# Patient Record
Sex: MALE | Race: ASIAN | Employment: FULL TIME | URBAN - METROPOLITAN AREA
[De-identification: names, ages, dates, MRNs, and addresses within clinical notes are randomized per-mention and may not be internally consistent; named-entity substitution may affect disease eponyms.]

---

## 2017-01-24 ENCOUNTER — TRANSFERRED RECORDS (OUTPATIENT)
Dept: HEALTH INFORMATION MANAGEMENT | Facility: CLINIC | Age: 47
End: 2017-01-24

## 2017-11-29 ENCOUNTER — TRANSFERRED RECORDS (OUTPATIENT)
Dept: HEALTH INFORMATION MANAGEMENT | Facility: CLINIC | Age: 47
End: 2017-11-29

## 2021-07-29 ENCOUNTER — TRANSFERRED RECORDS (OUTPATIENT)
Dept: HEALTH INFORMATION MANAGEMENT | Facility: CLINIC | Age: 51
End: 2021-07-29

## 2021-08-03 ENCOUNTER — TRANSCRIBE ORDERS (OUTPATIENT)
Dept: OTHER | Age: 51
End: 2021-08-03

## 2021-08-03 DIAGNOSIS — M46.1 BILATERAL SACROILIITIS (H): Primary | ICD-10-CM

## 2021-08-13 ENCOUNTER — TRANSCRIBE ORDERS (OUTPATIENT)
Dept: OTHER | Age: 51
End: 2021-08-13

## 2021-08-13 DIAGNOSIS — M46.1 BILATERAL SACROILIITIS (H): Primary | ICD-10-CM

## 2021-08-16 NOTE — TELEPHONE ENCOUNTER
RECORDS RECEIVED FROM: AMPARO sacroillitis/Dr. Leavitt/MRI/ortho con  [facility]   DATE RECEIVED: Oct 14, 2021     NOTES STATUS DETAILS   OFFICE NOTE from referring provider In process    OFFICE NOTE from other specialist N/A    DISCHARGE SUMMARY from hospital N/A    DISCHARGE REPORT from the ER N/A    OPERATIVE REPORT N/A    MEDICATION LIST Internal    IMPLANT RECORD/STICKER N/A    LABS     CBC/DIFF N/A    CULTURES N/A    INJECTIONS DONE IN RADIOLOGY N/A    MRI In process    CT SCAN In process    XRAYS (IMAGES & REPORTS) In process    TUMOR     PATHOLOGY  Slides & report N/A    08/26/21   9:31 AM   IMAGES IN PACS  COMPLETE  Genesis Regan CMA    08/18/21   3:51 PM   [facility] RECORDS SENT TO SCANNING  Genesis Regan CMA    08/16/21   1:48 PM   FAXED REQUEST TO [facility] [phone]  Genesis Regan CMA

## 2021-10-14 ENCOUNTER — PRE VISIT (OUTPATIENT)
Dept: ORTHOPEDICS | Facility: CLINIC | Age: 51
End: 2021-10-14

## 2021-10-14 ENCOUNTER — ANCILLARY PROCEDURE (OUTPATIENT)
Dept: GENERAL RADIOLOGY | Facility: CLINIC | Age: 51
End: 2021-10-14
Attending: ORTHOPAEDIC SURGERY
Payer: COMMERCIAL

## 2021-10-14 ENCOUNTER — PREP FOR PROCEDURE (OUTPATIENT)
Dept: ORTHOPEDICS | Facility: CLINIC | Age: 51
End: 2021-10-14

## 2021-10-14 ENCOUNTER — OFFICE VISIT (OUTPATIENT)
Dept: ORTHOPEDICS | Facility: CLINIC | Age: 51
End: 2021-10-14
Payer: COMMERCIAL

## 2021-10-14 VITALS — BODY MASS INDEX: 24.99 KG/M2 | HEIGHT: 65 IN | WEIGHT: 150 LBS

## 2021-10-14 DIAGNOSIS — M46.1 SACROILIITIS (H): Primary | ICD-10-CM

## 2021-10-14 DIAGNOSIS — M46.1 BILATERAL SACROILIITIS (H): Primary | ICD-10-CM

## 2021-10-14 DIAGNOSIS — M46.1 BILATERAL SACROILIITIS (H): ICD-10-CM

## 2021-10-14 PROCEDURE — 99204 OFFICE O/P NEW MOD 45 MIN: CPT | Mod: GC | Performed by: ORTHOPAEDIC SURGERY

## 2021-10-14 PROCEDURE — 72190 X-RAY EXAM OF PELVIS: CPT | Performed by: RADIOLOGY

## 2021-10-14 RX ORDER — CYCLOBENZAPRINE HCL 10 MG
TABLET ORAL PRN
COMMUNITY
Start: 2021-05-10

## 2021-10-14 RX ORDER — TEMAZEPAM 7.5 MG/1
CAPSULE ORAL
COMMUNITY
Start: 2021-10-05

## 2021-10-14 RX ORDER — SERTRALINE HYDROCHLORIDE 100 MG/1
50 TABLET, FILM COATED ORAL EVERY EVENING
COMMUNITY
Start: 2021-03-31

## 2021-10-14 ASSESSMENT — MIFFLIN-ST. JEOR: SCORE: 1459.77

## 2021-10-14 NOTE — NURSING NOTE
"Reason For Visit:   Chief Complaint   Patient presents with     Consult     AMPARO sacroillitis/Dr. Leavitt       Primary MD: Dr. Francisco Javier [facility]  Ref. MD: Dr. Leavitt [facility]    ?  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 Yes.  Work status?  Full time.    Date of injury: 30 yrs ago  Type of injury:  related.    Date of surgery: no  Type of surgery: no.    Smoker: Yes  Request smoking cessation information: No    Ht 1.655 m (5' 5.16\")   Wt 68 kg (150 lb)   BMI 24.84 kg/m      Pain Assessment  Patient Currently in Pain: Yes  0-10 Pain Scale: 7  Primary Pain Location: Back    Oswestry (MALINI) Questionnaire    OSWESTRY DISABILITY INDEX 10/14/2021   Count 9   Sum 26   Oswestry Score (%) 57.78          Visual Analog Pain Scale  Back Pain Scale 0-10: 7  Right leg pain: 9  Left leg pain: 5  Neck Pain Scale 0-10: 0  Right arm pain: 0  Left arm pain: 0    Promis 10 Assessment    PROMIS 10 10/14/2021   In general, would you say your health is: Very good   In general, would you say your quality of life is: Very good   In general, how would you rate your physical health? Very good   In general, how would you rate your mental health, including your mood and your ability to think? Very good   In general, how would you rate your satisfaction with your social activities and relationships? Very good   In general, please rate how well you carry out your usual social activities and roles Very good   To what extent are you able to carry out your everyday physical activities such as walking, climbing stairs, carrying groceries, or moving a chair? Completely   How often have you been bothered by emotional problems such as feeling anxious, depressed or irritable? Sometimes   How would you rate your fatigue on average? Mild   How would you rate your pain on average?   0 = No Pain  to  10 = Worst Imaginable Pain 7   In general, would you say your health is: 4   In general, would you say your quality of life is: 4   In " general, how would you rate your physical health? 4   In general, how would you rate your mental health, including your mood and your ability to think? 4   In general, how would you rate your satisfaction with your social activities and relationships? 4   In general, please rate how well you carry out your usual social activities and roles. (This includes activities at home, at work and in your community, and responsibilities as a parent, child, spouse, employee, friend, etc.) 4   To what extent are you able to carry out your everyday physical activities such as walking, climbing stairs, carrying groceries, or moving a chair? 5   In the past 7 days, how often have you been bothered by emotional problems such as feeling anxious, depressed, or irritable? 3   In the past 7 days, how would you rate your fatigue on average? 2   In the past 7 days, how would you rate your pain on average, where 0 means no pain, and 10 means worst imaginable pain? 7   Global Mental Health Score 15   Global Physical Health Score 15   PROMIS TOTAL - SUBSCORES 30   Some recent data might be hidden                Aleida Recinos LPN

## 2021-10-14 NOTE — LETTER
10/14/2021         RE: Jason Solis  [address]  [place] [address]        Dear Colleague,    Thank you for referring your patient, Jason Solis, to the [facility] [place]. Please see a copy of my visit note below.    Spine Surgery Consultation  REFERRING PHYSICIAN: No ref. provider found   PRIMARY CARE PHYSICIAN: No primary care provider on file.           Chief Complaint:   Consult (AMPARO sacroillitis/Dr. 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  The patient reports that he initially started to have left-sided low back pain with pain radiating down his leg in the 1990s when he was serving in [place] in the .  This was intermittent eventually subsided.  He now has had approximately 1 to 2 years of sustained right low back pain with some radiation down his right leg.  He has been evaluated by pain medication physical therapy many times over the last year at the [employer], [facility]    He was eventually seen by Dr. Daryl Leavitt; who felt his examination was most consistent with sacroiliitis and referred him here to be seen by Dr. Henry for consideration of SI joint fusion.  The patient does note that he had a few injections into the SI joint with very good short-term relief but no sustained relief from these injections.    He at this point is not able to participate in the activities he enjoys doing.  He has a tough time even sitting on his right buttock because of the sensitivity and pain.  He does have some pain radiating into his right shin area.  He has never had surgery on his low back or right SI joint.  He is does not smoke.  He is interesting in  "attempting to improve his quality of life as it is complete very limited by this problem right now current.         Past Medical History:   No past medical history on file.         Past Surgical History:   No past surgical history on file.         Social History:     Social History     Tobacco Use     Smoking status: Not on file   Substance Use Topics     Alcohol use: Not on file            Family History:   No family history on file.         Allergies:     Allergies   Allergen Reactions     Duloxetine Visual Disturbance and Anxiety     Other reaction(s): Drowsy, Visual disturbance, Anxiety     Etodolac Rash          Medications:     Current Outpatient Medications   Medication     cyclobenzaprine (FLEXERIL) 10 MG tablet     sertraline (ZOLOFT) 100 MG tablet     temazepam (RESTORIL) 7.5 MG capsule     No current facility-administered medications for this visit.             Review of Systems:     A 10 point ROS was performed and reviewed. Specific responses to these questions are noted at the end of the document.         Physical Exam:   Vitals: Ht 1.655 m (5' 5.16\")   Wt 68 kg (150 lb)   BMI 24.84 kg/m    Constitutional: awake, alert, cooperative, no apparent distress, appears stated age.    Eyes: The sclera are white.  Ears, Nose, Throat: The trachea is midline.  Psychiatric: The patient has a normal affect.  Respiratory: breathing non-labored  Cardiovascular: The extremities are warm and perfused.  Skin: no obvious rashes or lesions.  Musculoskeletal, Neurologic, and Spine:     SI joint exam:       Right   Left     Madiha Finger Test    positive   negative     LIVIER    Positive   Negative     Thigh Thrust:   Positive   Negative     Pelvic Compression Test    Positive   Negative     Palpation    Positive   Negative     Pelvic Gapping    Positive   Negative     Gaenslen s Test    Positive    Negative     Sacral Thrust (SI)   Positive   Negative     MALINI is 57.78         Imaging:   We ordered and independently " reviewed new radiographs at this clinic visit. The results were discussed with the patient.  Findings include:  Inlet outlet and lateral of the sacrum x-rays obtained today were reviewed and show some sacral dysmorphism with the upsloping sacral portion articulating with the ileum at the SI joint.          MRI of the lumbar spine does show some very early degenerative changes but no severe stenosis or severe spondylolisthesis.           Assessment and Plan:   Assessment:  51 year old male with right sacroiliitis     This patient meets the JUAN JOSÉ and ISASS criteria for minimally invasive SI fusion. These include 3/5 physical exam manuevers being positive, a positive response to injection, failure of extensive non-surgical management, imaging that rules out other possible pain generators (MRI of lumbar spine showing the sacrum, and plain films showing preserved joint space). This patient does not have a generalized pain disorder or generalized pain behavior. Diagnostic imaging, including CT scan and XR, exclude other conditions including tumor, infection, inflammatory arthropathy, and other conditions that could cause low back or buttock pain (hip pathology or spine pathology). CT scan and XR pelvis demonstrate degeneration of right sacroiliac joint which supports diagnosis of right/left sacroiliitis.     Sacroiliac joint pain has been present and has caused significant impairment of patient's daily activities for more then 12 months. Patient has tried and failed conservative management. They have had at least 2 image-guided, contrast-enhanced intra-articular sacroiliac joint anesthetic/ therapeutic injections into the right sacroiliac joint which provided 80-90% relief of patient's pain. Patient has tried medical management, activity modification, and active physical therapy programs targeted at lumbar spine, pelvis, and Sacroiliac joint without relief.  Due to all of these reasons, and failed conservative management,  we recommend minimally invasive right sacroiliac joint fusion to alleviate patient's sacroiliitis.     We discussed minimally invasive sacroiliac joint fusion in detail.  It is performed using titanium triangular rods.  About 70-80% of patients who have an SI fusion experience roughly 50% improvement in symptoms.  About 30% of Dr. Henry's patients have gone on to need the contralateral side fused as well.  We discussed the procedure in detail, and we reviewed the relevant anatomy using a model of the pelvis.  Weight bearing on the surgical side will be limited for three weeks after surgery, and the patient will need crutches during this time.  Physical therapy will be initiated after three weeks, and full recovery typically takes 6-12 weeks.      We reviewed the risks and benefits of the surgery in detail. The risks include those associated with anesthesia, including death, pulmonary embolism, DVT, stroke, myocardial infarction, pneumonia, blindness, and urinary tract infection. Additional risks include the risk of blood loss, infection, nerve damage, failure to heal, hardware problems and failure of the intervention to improve symptoms.  With regard to blood loss, we use a medication called tranexamic acid.  Blood loss is typically around 25 mL during an SI fusion, and we have not had to transfuse anyone during or after this particular procedure.  To mitigate the risk of infection, we use IV antibiotics.  Deep infections after this surgery are rare.  To avoid hardware problems and nerve damage, we use an intra-operative CT, which is like GPS for the spine. The patient understands the risks of the surgery and wishes to proceed.     Plan:  1. Right SI joint fusion     Angel Small MD   Orthopaedic Surgery Resident     I saw and evaluated the patient and developed the plan.  Yvan Henry MD              Again, thank you for allowing me to participate in the care of your patient.        Sincerely,        Yvan  Juwan Henry MD

## 2021-10-14 NOTE — LETTER
Date:December 31, 2021      Patient was self referred, no letter generated. Do not send.        [facility] Information

## 2021-10-20 ENCOUNTER — TELEPHONE (OUTPATIENT)
Dept: ORTHOPEDICS | Facility: CLINIC | Age: 51
End: 2021-10-20

## 2021-10-20 NOTE — TELEPHONE ENCOUNTER
Patient is scheduled for surgery with Dr. Henry    Spoke with: Patient    Date of Surgery: 12/6/21    Location: [place]    Post ops: 6 & 12 weeks    Pre op with Provider: Complete    H&P: Scheduled with PAC 11/10/21    Pre-procedure COVID-19 Test: [place] 12/2/21    Additional imaging/appointments: N/A    Surgery packet: Received in clinic     Additional comments: N/A

## 2021-10-20 NOTE — TELEPHONE ENCOUNTER
FUTURE VISIT INFORMATION      SURGERY INFORMATION:    Date: 12/6/21    Location: [facility]    Surgeon:  Yvan Henry MD    Anesthesia Type:  General    Procedure: Minimally invasive Right sacroiliac joint fusion    Consult: OV 10/14    RECORDS REQUESTED FROM:       [facility]

## 2021-10-29 ENCOUNTER — DOCUMENTATION ONLY (OUTPATIENT)
Dept: ORTHOPEDICS | Facility: CLINIC | Age: 51
End: 2021-10-29

## 2021-11-03 DIAGNOSIS — Z11.59 ENCOUNTER FOR SCREENING FOR OTHER VIRAL DISEASES: ICD-10-CM

## 2021-11-10 ENCOUNTER — LAB (OUTPATIENT)
Dept: LAB | Facility: CLINIC | Age: 51
End: 2021-11-10
Payer: COMMERCIAL

## 2021-11-10 ENCOUNTER — OFFICE VISIT (OUTPATIENT)
Dept: SURGERY | Facility: CLINIC | Age: 51
End: 2021-11-10
Payer: COMMERCIAL

## 2021-11-10 ENCOUNTER — ANESTHESIA EVENT (OUTPATIENT)
Dept: SURGERY | Facility: CLINIC | Age: 51
End: 2021-11-10

## 2021-11-10 VITALS
BODY MASS INDEX: 24.19 KG/M2 | HEIGHT: 66 IN | SYSTOLIC BLOOD PRESSURE: 151 MMHG | RESPIRATION RATE: 16 BRPM | HEART RATE: 63 BPM | OXYGEN SATURATION: 99 % | DIASTOLIC BLOOD PRESSURE: 95 MMHG | WEIGHT: 150.5 LBS | TEMPERATURE: 98 F

## 2021-11-10 DIAGNOSIS — Z01.818 PREOP EXAMINATION: ICD-10-CM

## 2021-11-10 DIAGNOSIS — M46.1 SACROILIITIS (H): ICD-10-CM

## 2021-11-10 DIAGNOSIS — Z01.818 PREOP EXAMINATION: Primary | ICD-10-CM

## 2021-11-10 LAB
ABO/RH(D): NORMAL
ANION GAP SERPL CALCULATED.3IONS-SCNC: 5 MMOL/L (ref 3–14)
ANTIBODY SCREEN: NEGATIVE
BASOPHILS # BLD AUTO: 0.1 10E3/UL (ref 0–0.2)
BASOPHILS NFR BLD AUTO: 1 %
BUN SERPL-MCNC: 11 MG/DL (ref 7–30)
CALCIUM SERPL-MCNC: 9.1 MG/DL (ref 8.5–10.1)
CHLORIDE BLD-SCNC: 106 MMOL/L (ref 94–109)
CO2 SERPL-SCNC: 30 MMOL/L (ref 20–32)
CREAT SERPL-MCNC: 1.01 MG/DL (ref 0.66–1.25)
EOSINOPHIL # BLD AUTO: 0.2 10E3/UL (ref 0–0.7)
EOSINOPHIL NFR BLD AUTO: 4 %
ERYTHROCYTE [DISTWIDTH] IN BLOOD BY AUTOMATED COUNT: 11.9 % (ref 10–15)
GFR SERPL CREATININE-BSD FRML MDRD: 85 ML/MIN/1.73M2
GLUCOSE BLD-MCNC: 97 MG/DL (ref 70–99)
HCT VFR BLD AUTO: 44.1 % (ref 40–53)
HGB BLD-MCNC: 14.3 G/DL (ref 13.3–17.7)
IMM GRANULOCYTES # BLD: 0 10E3/UL
IMM GRANULOCYTES NFR BLD: 0 %
LYMPHOCYTES # BLD AUTO: 1.1 10E3/UL (ref 0.8–5.3)
LYMPHOCYTES NFR BLD AUTO: 23 %
MCH RBC QN AUTO: 33.3 PG (ref 26.5–33)
MCHC RBC AUTO-ENTMCNC: 32.4 G/DL (ref 31.5–36.5)
MCV RBC AUTO: 103 FL (ref 78–100)
MONOCYTES # BLD AUTO: 0.3 10E3/UL (ref 0–1.3)
MONOCYTES NFR BLD AUTO: 7 %
NEUTROPHILS # BLD AUTO: 3.1 10E3/UL (ref 1.6–8.3)
NEUTROPHILS NFR BLD AUTO: 65 %
NRBC # BLD AUTO: 0 10E3/UL
NRBC BLD AUTO-RTO: 0 /100
PLATELET # BLD AUTO: 265 10E3/UL (ref 150–450)
POTASSIUM BLD-SCNC: 4.2 MMOL/L (ref 3.4–5.3)
RBC # BLD AUTO: 4.29 10E6/UL (ref 4.4–5.9)
SODIUM SERPL-SCNC: 141 MMOL/L (ref 133–144)
SPECIMEN EXPIRATION DATE: NORMAL
WBC # BLD AUTO: 4.7 10E3/UL (ref 4–11)

## 2021-11-10 PROCEDURE — 86850 RBC ANTIBODY SCREEN: CPT | Mod: 90 | Performed by: PATHOLOGY

## 2021-11-10 PROCEDURE — 85025 COMPLETE CBC W/AUTO DIFF WBC: CPT | Performed by: PATHOLOGY

## 2021-11-10 PROCEDURE — 86901 BLOOD TYPING SEROLOGIC RH(D): CPT | Mod: 90 | Performed by: PATHOLOGY

## 2021-11-10 PROCEDURE — 80048 BASIC METABOLIC PNL TOTAL CA: CPT | Performed by: PATHOLOGY

## 2021-11-10 PROCEDURE — 99000 SPECIMEN HANDLING OFFICE-LAB: CPT | Performed by: PATHOLOGY

## 2021-11-10 PROCEDURE — 99204 OFFICE O/P NEW MOD 45 MIN: CPT | Performed by: PHYSICIAN ASSISTANT

## 2021-11-10 PROCEDURE — 36415 COLL VENOUS BLD VENIPUNCTURE: CPT | Performed by: PATHOLOGY

## 2021-11-10 PROCEDURE — 86900 BLOOD TYPING SEROLOGIC ABO: CPT | Mod: 90 | Performed by: PATHOLOGY

## 2021-11-10 ASSESSMENT — ENCOUNTER SYMPTOMS: SEIZURES: 0

## 2021-11-10 ASSESSMENT — MIFFLIN-ST. JEOR: SCORE: 1475.41

## 2021-11-10 ASSESSMENT — LIFESTYLE VARIABLES: TOBACCO_USE: 1

## 2021-11-10 ASSESSMENT — PAIN SCALES - GENERAL: PAINLEVEL: SEVERE PAIN (7)

## 2021-11-10 NOTE — H&P
Pre-Operative H & P     CC:  Preoperative exam to assess for increased cardiopulmonary risk while undergoing surgery and anesthesia.    Date of Encounter: 11/10/2021  Primary Care Physician:  No primary care provider on file.     Reason for visit:   Encounter Diagnoses   Name Primary?     Sacroiliitis (H) Yes     Preop examination        HPI  Jason Solis is a 52 y/o male who presents for pre-operative H&P in preparation for Minimally invasive Right sacroiliac joint fusion with Yvan Henry MD on 12/6/21 at [facility] for treatment of Sacroiliitis (H). Patient is being evaluated for comorbid conditions of tobacco use, anxiety, tinnitus, hx head injury, Mooers palsy, insomnia, GERD, IBS, ulnar nerve entrapment at elbow, cervicalgia.     Mr. Solis has a longstanding history of right-sided low back pain.  The patient reports that he initially started to have left-sided low back pain with pain radiating down his leg in the 1990s that  was intermittent and eventually subsided.  He now has had approximately 1 to 2 years of sustained right low back pain with some radiation down his right leg. He had a few injections into the SI joint with very good short-term relief but no sustained relief from these injections. He at this point is not able to participate in the activities he enjoys doing.  He has a tough time even sitting on his right buttock because of the sensitivity and pain. He now presents for the above procedure.    History was obtained from patient & chart review.     Hx of abnormal bleeding or anti-platelet use: denies    Menstrual history: No LMP for male patient.:      Prior to Admission Medications  Current Outpatient Medications   Medication Sig Dispense Refill     cyclobenzaprine (FLEXERIL) 10 MG tablet as needed       sertraline (ZOLOFT) 100 MG tablet every evening        temazepam (RESTORIL) 7.5 MG capsule nightly as needed          Family History  Family  History   Problem Relation Age of Onset     Anesthesia Reaction No family hx of      Cardiovascular No family hx of      Deep Vein Thrombosis (DVT) No family hx of        The complete review of systems is negative other than noted in the HPI or here.       Anesthesia Pre-Procedure Evaluation    Patient: Jason Solis   MRN: [id number] : 1970        Preoperative Diagnosis: * No surgery found *    Procedure :   PAC EVALUATION       Past Medical History:   Diagnosis Date     Alcohol abuse      Anxiety      Bell's palsy      Cervicalgia      Gastroesophageal reflux disease without esophagitis      Insomnia      Irritable bowel syndrome      Sacroiliitis (H)      Tinnitus, unspecified laterality      Tobacco dependence syndrome      Ulnar nerve entrapment at elbow       Past Surgical History:   Procedure Laterality Date     COLONOSCOPY       TONSILLECTOMY        Allergies   Allergen Reactions     Duloxetine Visual Disturbance and Anxiety     Other reaction(s): Drowsy, Visual disturbance, Anxiety     Etodolac Rash      Social History     Tobacco Use     Smoking status: Current Some Day Smoker     Years: 10.00     Types: Cigars, Cigarettes     Smokeless tobacco: Never Used     Tobacco comment: occasional cigar (11/10/21); smoked cigarettes ~ 10 yrs   Substance Use Topics     Alcohol use: Not on file      Wt Readings from Last 1 Encounters:   11/10/21 68.3 kg (150 lb 8 oz)            ROS/MED HX  The complete review of systems is negative other than noted in the HPI or here.  Patient denies recent illness, fever and respiratory infection during past month.  Pt denies steroid use during past year.    ENT/Pulmonary: Comment: Smokes occasional cigar and 1-2 cigarettes per week. Has 10 yr hx cigarettes      (+) tobacco use, Current use,  (-) asthma and sleep apnea   Neurologic: Comment: Hx right Bell's palsy    Tinnitus    Insomnia      (+) no peripheral neuropathy  (-) no seizures, no CVA and migraines  "  Cardiovascular:  - neg cardiovascular ROS     METS/Exercise Tolerance: 4 - Raking leaves, gardening Comment: Able to ascend stairs w/o difficulty. Denies SOB & CP.   Hematologic:  - neg hematologic  ROS  (-) history of blood clots and history of blood transfusion   Musculoskeletal: Comment: B/L carpal tunnel    Arthritis in knees, hips, neck (C5-6 herniation)    (+) arthritis,     GI/Hepatic: Comment: Occasional GERD w/ specific foods   (-) liver disease   Renal/Genitourinary:  - neg Renal ROS  (-) renal disease   Endo:  - neg endo ROS  (-) Type II DM   Psychiatric/Substance Use:     (+) psychiatric history anxiety alcohol abuse     Infectious Disease:  - neg infectious disease ROS     Malignancy:  - neg malignancy ROS     Other:  - neg other ROS          Physical Exam    Airway      Comment: Castano    Mallampati: I   TM distance: > 3 FB   Neck ROM: full   Mouth opening: > 3 cm    Respiratory Devices and Support         Dental  no notable dental history.  Missing 2 lower molars B/L      Cardiovascular   cardiovascular exam normal          Pulmonary   pulmonary exam normal               PAC Discussion and Assessment    ASA Classification: 2  Case is suitable for: [facility]    BP (!) 151/95 (BP Location: Right arm, Patient Position: Chair, Cuff Size: Adult Regular)   Pulse 63   Temp 98  F (36.7  C) (Oral)   Resp 16   Ht 1.676 m (5' 6\")   Wt 68.3 kg (150 lb 8 oz)   SpO2 99%   BMI 24.29 kg/m           Physical Exam  Constitutional: Awake, alert, cooperative, no apparent distress, and appears stated age.  Eyes: Pupils equal, round and reactive to light, extra ocular muscles intact, sclera clear, conjunctiva normal.  HENT: Normocephalic, oral pharynx with moist mucus membranes, good dentition, missing 2 lower molars B/L. No goiter appreciated. No removable dental hardware.  Respiratory: Clear to auscultation bilaterally, no crackles or wheezing. No SOB when supine.  Cardiovascular: Regular rate and rhythm, normal " S1 and S2, and no murmur noted.  Carotids +2, no bruits. No edema. Palpable pulses to radial, DP and PT arteries.   GI: Normal bowel sounds, soft, non-distended, non-tender, no masses palpated.    Lymph/Hematologic: No cervical lymphadenopathy and no supraclavicular lymphadenopathy.  Genitourinary:  deferred  Skin: Warm and dry.  No rashes.   Musculoskeletal: full ROM of neck. There is no redness, warmth, or swelling of the joints. Gross motor strength is normal.    Neurologic: Awake, alert, oriented to name, place and time. Cranial nerves II-XII are grossly intact. Gait is normal. Ambulates from chair to exam table, seats self, lies supine and sits back up w/o assistance.  Neuropsychiatric: Calm, cooperative. Normal affect. Pleasant. Answers questions appropriately, follows commands w/o difficulty.    PRIOR LABS/DIAGNOSTIC STUDIES:    All labs and imaging personally reviewed      3 views pelvis radiograph(s) 10/14/2021  IMPRESSION:  1. No substantial degenerative change at either sacroiliac joint.  2. Mild degenerative change of bilateral hips with loss of normal  sphericity of the bilateral femoral heads but relative preservation of  joint spaces.    The patient's records and results personally reviewed by this provider.         LABS/DIAGNOSTIC STUDIES TODAY:  BMP, CBC, T&S    WBC Count 4.0 - 11.0 10e3/uL 4.7      RBC Count 4.40 - 5.90 10e6/uL 4.29 Low      Hemoglobin 13.3 - 17.7 g/dL 14.3     Hematocrit 40.0 - 53.0 % 44.1     MCV 78 - 100 fL 103 High      MCH 26.5 - 33.0 pg 33.3 High      MCHC 31.5 - 36.5 g/dL 32.4     RDW 10.0 - 15.0 % 11.9     Platelet Count 150 - 450 10e3/uL 265     % Neutrophils % 65     % Lymphocytes % 23     % Monocytes % 7     % Eosinophils % 4     % Basophils % 1     % Immature Granulocytes % 0     NRBCs per 100 WBC <1 /100 0     Absolute Neutrophils 1.6 - 8.3 10e3/uL 3.1     Absolute Lymphocytes 0.8 - 5.3 10e3/uL 1.1     Absolute Monocytes 0.0 - 1.3 10e3/uL 0.3     Absolute Eosinophils  0.0 - 0.7 10e3/uL 0.2     Absolute Basophils 0.0 - 0.2 10e3/uL 0.1     Absolute Immature Granulocytes <=0.0 10e3/uL 0.0     Absolute NRBCs 10e3/uL 0.0        Sodium 133 - 144 mmol/L 141      Potassium 3.4 - 5.3 mmol/L 4.2     Chloride 94 - 109 mmol/L 106     Carbon Dioxide (CO2) 20 - 32 mmol/L 30     Anion Gap 3 - 14 mmol/L 5     Urea Nitrogen 7 - 30 mg/dL 11     Creatinine 0.66 - 1.25 mg/dL 1.01     Calcium 8.5 - 10.1 mg/dL 9.1     Glucose 70 - 99 mg/dL 97     GFR Estimate >60 mL/min/1.73m2 85        COVID19 testing scheduled on 12/2/21      ASSESSMENT and PLAN  Jason Solis is a 52 y/o male who presents for pre-operative H&P in preparation for Minimally invasive Right sacroiliac joint fusion with Yvan Henry MD on 12/6/21 at [facility] for treatment of Sacroiliitis (H).    Pt has had prior anesthetic:  MAC  No history of anesthetic complications.       He has the following specific operative considerations:   # RASHIDA 2/8 = low risk  # VTE risk: 0.26%  # Risk of PONV score = 1.  If > 2, anti-emetic intervention recommended.  # Anesthesia considerations:  Refer to PAC assessment in anesthesia records      CARDIAC: METS 4 -  Able to ascend stairs w/o difficulty. Denies SOB & CP. Endorses LBP when walking >2 blocks.     # RCRI : No serious cardiac risks.  0.4% risk of major adverse cardiac event.       PULMONARY:     # Current smoker, smokes occasional cigar, smokes 2-3 cigarettes per day. Endorses 10 yr smoking hx. Lungs CTA on exam.    # Denies asthma or inhaler use    GI:     # Occasional GERD w/ specific foods     ENDO: BMI 25    # No DM    ORTHO:     # full ROM of neck    # Sacroiliitis    # B/L carpal tunnel    # Arthritis in knees, hips, neck (C5-6 herniation per pt)    # Ulnar nerve entrapment of elbow    NEURO/PSYCH:     # Hx right Bell's palsy    # Tinnitus    # Insomnia      Patient is optimized and is acceptable candidate for the proposed procedure. No further  diagnostic evaluation is needed.      Final plan per anesthesiologist on day of surgery.     Arrival time, NPO, shower and medication instructions provided by nursing staff today.  Preparing For Your Surgery handout given.      On the day of service:     Prep time: 15 minutes  Visit time: 20 minutes  Documentation time: 10 minutes  ------------------------------------------  Total time: 45 minutes      Sheba Pascual PA-C  Preoperative Assessment Center  [facility]  Cl[facility] and [facility] Center  Phone: [phone]  Fax: [phone]

## 2021-11-10 NOTE — ANESTHESIA PREPROCEDURE EVALUATION
Anesthesia Pre-Procedure Evaluation    Patient: Jason Solis   MRN: [id number] : 1970        Preoperative Diagnosis: * No surgery found *    Procedure :   PAC EVALUATION       Past Medical History:   Diagnosis Date     Alcohol abuse      Anxiety      Bell's palsy      Cervicalgia      Gastroesophageal reflux disease without esophagitis      Insomnia      Irritable bowel syndrome      Sacroiliitis (H)      Tinnitus, unspecified laterality      Tobacco dependence syndrome      Ulnar nerve entrapment at elbow       Past Surgical History:   Procedure Laterality Date     COLONOSCOPY       TONSILLECTOMY        Allergies   Allergen Reactions     Duloxetine Visual Disturbance and Anxiety     Other reaction(s): Drowsy, Visual disturbance, Anxiety     Etodolac Rash      Social History     Tobacco Use     Smoking status: Current Some Day Smoker     Years: 10.00     Types: Cigars, Cigarettes     Smokeless tobacco: Never Used     Tobacco comment: occasional cigar (11/10/21); smoked cigarettes ~ 10 yrs   Substance Use Topics     Alcohol use: Not on file      Wt Readings from Last 1 Encounters:   11/10/21 68.3 kg (150 lb 8 oz)        Anesthesia Evaluation   Pt has had prior anesthetic. Type: MAC.    No history of anesthetic complications       ROS/MED HX  ENT/Pulmonary: Comment: Smokes occasional cigar and 1-2 cigarettes per week. Has 10 yr hx cigarettes      (+) tobacco use, Current use,  (-) asthma and sleep apnea   Neurologic: Comment: Hx right Bell's palsy    Tinnitus    Insomnia      (+) no peripheral neuropathy  (-) no seizures, no CVA and migraines   Cardiovascular:  - neg cardiovascular ROS     METS/Exercise Tolerance: 4 - Raking leaves, gardening Comment: Able to ascend stairs w/o difficulty. Denies SOB & CP.   Hematologic:  - neg hematologic  ROS  (-) history of blood clots and history of blood transfusion   Musculoskeletal: Comment: B/L carpal tunnel    Arthritis in knees, hips, neck (C5-6 herniation)    (+)  arthritis,     GI/Hepatic: Comment: Occasional GERD w/ specific foods   (-) liver disease   Renal/Genitourinary:  - neg Renal ROS  (-) renal disease   Endo:  - neg endo ROS  (-) Type II DM   Psychiatric/Substance Use:     (+) psychiatric history anxiety alcohol abuse     Infectious Disease:  - neg infectious disease ROS     Malignancy:  - neg malignancy ROS     Other:  - neg other ROS          Physical Exam    Airway      Comment: Castano    Mallampati: I   TM distance: > 3 FB   Neck ROM: full   Mouth opening: > 3 cm    Respiratory Devices and Support         Dental  no notable dental history         Cardiovascular   cardiovascular exam normal          Pulmonary   pulmonary exam normal                OUTSIDE LABS:  CBC: No results found for: WBC, HGB, HCT, PLT  BMP: No results found for: NA, POTASSIUM, CHLORIDE, CO2, BUN, CR, GLC  COAGS: No results found for: PTT, INR, FIBR  POC: No results found for: BGM, HCG, HCGS  HEPATIC: No results found for: ALBUMIN, PROTTOTAL, ALT, AST, GGT, ALKPHOS, BILITOTAL, BILIDIRECT, MINESH  OTHER: No results found for: PH, LACT, A1C, GABRIELA, PHOS, MAG, LIPASE, AMYLASE, TSH, T4, T3, CRP, SED          PAC Discussion and Assessment    ASA Classification: 2  Case is suitable for: [place]  Anesthetic techniques and relevant risks discussed: GA  Invasive monitoring and risk discussed: No    Possibility and Risk of blood transfusion discussed: No            PAC Resident/NP Anesthesia Assessment: Jason Solis is a 52 y/o male who presents for pre-operative H&P in preparation for Minimally invasive Right sacroiliac joint fusion with Yvan Henry MD on 12/6/21 at [facility] for treatment of Sacroiliitis (H).    Pt has had prior anesthetic:  MAC  No history of anesthetic complications.       He has the following specific operative considerations:   # RASHIDA 2/8 = low risk  # VTE risk: 0.26%  # Risk of PONV score = 1.  If > 2, anti-emetic intervention  recommended.  # Anesthesia considerations:  Refer to PAC assessment in anesthesia records      CARDIAC: METS 4 -  Able to ascend stairs w/o difficulty. Denies SOB & CP. Endorses LBP when walking >2 blocks.     # RCRI : No serious cardiac risks.  0.4% risk of major adverse cardiac event.       PULMONARY:     # Current smoker, smokes occasional cigar, smokes 2-3 cigarettes per day. Endorses 10 yr smoking hx. Lungs CTA on exam.    # Denies asthma or inhaler use    GI:     # Occasional GERD w/ specific foods     ENDO: BMI 25    # No DM    ORTHO:     # full ROM of neck    # Sacroiliitis    # B/L carpal tunnel    # Arthritis in knees, hips, neck (C5-6 herniation per pt)    # Ulnar nerve entrapment of elbow    NEURO/PSYCH:     # Hx right Bell's palsy    # Tinnitus    # Insomnia      Patient is optimized and is acceptable candidate for the proposed procedure. No further diagnostic evaluation is needed.      Final plan per anesthesiologist on day of surgery.       Reviewed and Signed by PAC Mid-Level Provider/Resident  Mid-Level Provider/Resident: Sheba Pascual PA-C  Date: 11/10/21  Time: 1031                               Sheba Pascual PA-C

## 2021-11-10 NOTE — PATIENT INSTRUCTIONS
Preparing for Your Surgery      Name:  Jason Solis   MRN:  [id number]   :  1970   Today's Date:  11/10/2021       Arriving for surgery:  Surgery date:  2021  Arrival time:  5:30AM    Restrictions due to COVID 19:       One visitor is allowed in the Pre Op area.       When you go into surgery, one visitor is allowed to wait in the Surgery Waiting Room       (provided there is enough space to social distance).         In hospital patients are allowed 1 visitor per day       The visitor may change daily     Visiting Hours: 8 am - 8:30 pm   No ill visitors.   All visitors must wear face mask.    [email or web address] parking is available for anyone with mobility limitations or disabilities.  ([place]  24 hours/ 7 days a week; [facility]  7 am- 3:30 pm, Mon- Fri)    Please come to:     [facility] [address]  [address] [place]  [address]    -[email or web address] parking is available in front of [facility] from7AM-3:30PM. If you prefer, park your car in the Green Lot.    -Proceed to the 3rd floor, check in at the Adult Surgery Waiting Lounge. [phone]    If an escort is needed stop at the Information Desk in the lobby. Inform the information person that you are here for surgery. An escort to the Adult Surgery Waiting Lounge will be provided.        What can I eat or drink?  -  You may eat and drink normally for up to 8 hours before your surgery. (Until 11:30PM )  -  You may have clear liquids until 2 hours before surgery. (Until 5:30AM)    Examples of clear liquids:  Water  Clear broth  Juices (apple, white grape, white cranberry  and cider) without pulp  Noncarbonated, powder based beverages  (lemonade and Dominic-Aid)  Sodas (Sprite, 7-Up, ginger ale and seltzer)  Coffee or tea (without milk or cream)  Gatorade    -  No Alcohol for at least 24 hours before surgery     Which medicines can I take?    Hold Aspirin for 7 days before surgery.   Hold  Multivitamins for 7 days before surgery.  Hold Supplements for 7 days before surgery.  Hold Ibuprofen (Advil, Motrin) for 1 day before surgery--unless otherwise directed by surgeon.  Hold Naproxen (Aleve) for 4 days before surgery.      -  PLEASE TAKE these medications the day of surgery:  Flexeril if needed    How do I prepare myself?  - Please take 2 showers before surgery using Scrubcare or Hibiclens soap.    Use this soap only from the neck to your toes.     Leave the soap on your skin for one minute--then rinse thoroughly.      You may use your own shampoo and conditioner; no other hair products.   - Please remove all jewelry and body piercings.  - No lotions, deodorants or fragrance.  - No makeup or fingernail polish.   - Bring your ID and insurance card.    -If you have a Deep Brain Stimulator, Spinal Cord Stimulator or any neuro stimulator device---you must bring the remote control to the hospital     - All patients are required to have a Covid-19 test within 4 days of surgery/procedure.      -Patients will be contacted by the [facility] scheduling team within 1 week of surgery to make an appointment.      - Patients may call the Scheduling team at [phone] if they have not been scheduled within 4 days of  surgery.      ALL PATIENTS GOING HOME THE SAME DAY OF SURGERY ARE REQUIRED TO HAVE A RESPONSIBLE ADULT TO DRIVE AND BE IN ATTENDANCE WITH THEM FOR 24 HOURS FOLLOWING SURGERY.      Questions or Concerns:    - For any questions regarding the day of surgery or your hospital stay, please contact the Pre Admission Nursing Office at [phone].       - If you have health changes between today and your surgery please call your surgeon.       For questions after surgery please call your surgeons office.

## 2021-11-11 DIAGNOSIS — M46.1 SACROILIITIS (H): Primary | ICD-10-CM

## 2021-11-29 ENCOUNTER — TELEPHONE (OUTPATIENT)
Dept: ORTHOPEDICS | Facility: CLINIC | Age: 51
End: 2021-11-29

## 2021-11-29 NOTE — TELEPHONE ENCOUNTER
Phoned patient to let him know that his surgery with Dr Henry on 12/6/21 needs to be rescheduled due to an unexpected change in Dr Henry's schedule. Patient was offered a few next available options and my number to call back when he is able. [phone]

## 2021-11-29 NOTE — TELEPHONE ENCOUNTER
Received call back from patient, agreeable to reschedule his surgery from 12/6/21 to 12/20/21 with Dr Henry. Patient will update his H&P virtually with PAC on 12/7 and has rescheduled his COVID test.

## 2021-12-07 ENCOUNTER — PRE VISIT (OUTPATIENT)
Dept: SURGERY | Facility: CLINIC | Age: 51
End: 2021-12-07

## 2021-12-07 ENCOUNTER — ANESTHESIA EVENT (OUTPATIENT)
Dept: SURGERY | Facility: CLINIC | Age: 51
End: 2021-12-07
Payer: COMMERCIAL

## 2021-12-07 ENCOUNTER — VIRTUAL VISIT (OUTPATIENT)
Dept: SURGERY | Facility: CLINIC | Age: 51
End: 2021-12-07
Payer: COMMERCIAL

## 2021-12-07 DIAGNOSIS — Z01.818 PREOP EXAMINATION: Primary | ICD-10-CM

## 2021-12-07 PROCEDURE — 99213 OFFICE O/P EST LOW 20 MIN: CPT | Mod: 95 | Performed by: PHYSICIAN ASSISTANT

## 2021-12-07 RX ORDER — MULTIPLE VITAMINS W/ MINERALS TAB 9MG-400MCG
1 TAB ORAL EVERY MORNING
COMMUNITY

## 2021-12-07 ASSESSMENT — LIFESTYLE VARIABLES: TOBACCO_USE: 1

## 2021-12-07 ASSESSMENT — ENCOUNTER SYMPTOMS: SEIZURES: 0

## 2021-12-07 ASSESSMENT — PAIN SCALES - GENERAL: PAINLEVEL: SEVERE PAIN (6)

## 2021-12-07 NOTE — PROGRESS NOTES
Jason is a 51 year old who is being evaluated via a billable video visit.      How would you like to obtain your AVS? MyChart  If the video visit is dropped, the invitation should be resent by: Text to cell phone: [phone]    HPI         Review of Systems         Objective    Vitals - Patient Reported  Pain Score: Severe Pain (6) (Lower back, Right side leg)        Physical Exam

## 2021-12-07 NOTE — H&P (VIEW-ONLY)
Pre-Operative H & P     CC:  Preoperative exam to assess for increased cardiopulmonary risk while undergoing surgery and anesthesia.    Date of Encounter: 12/7/2021  Primary Care Physician:  [facility], [facility]     Reason for Visit: Sacroiliitis    Video-Visit Details    Type of service:  Video Visit    Patient verbally consented to video service today: YES    Video Start Time: 0743  Video End Time (time video stopped): 0746    Originating Location (pt. Location): Home    Distant Location (provider location):  [facility] PREOPERATIVE [facility]     Mode of Communication:  Video Conference via Affib[employer]      [facility]  Jason Solis is a 52 y/o male who presents for pre-operative H&P in preparation for Minimally invasive Right sacroiliac joint fusion with Yvan Henry MD on 12/20/21 at [facility] for treatment of Sacroiliitis (H). This procedure was initially scheduled on 12/6/21, but was postponed due to an unexpected change in Dr Henry's schedule.    Patient is being evaluated for comorbid conditions of tobacco use, anxiety, tinnitus, hx head injury, Mize palsy, insomnia, GERD, IBS, ulnar nerve entrapment at elbow, cervicalgia.      Mr. Solis has a longstanding history of right-sided low back pain.  The patient reports that he initially started to have left-sided low back pain with pain radiating down his leg in the 1990s that  was intermittent and eventually subsided.  He now has had approximately 1 to 2 years of sustained right low back pain with some radiation down his right leg. He had a few injections into the SI joint with very good short-term relief but no sustained relief from these injections. He at this point is not able to participate in the activities he enjoys doing.  He has a tough time even sitting on his right buttock because of the sensitivity and pain. He now presents for the above procedure.     History was obtained from patient & chart  review.     Hx of abnormal bleeding or anti-platelet use: denies    Menstrual history: No LMP for male patient.:      Prior to Admission Medications  Current Outpatient Medications   Medication Sig Dispense Refill     cyclobenzaprine (FLEXERIL) 10 MG tablet as needed       ibuprofen (ADVIL/MOTRIN) 100 MG tablet Take 100 mg by mouth every 4 hours as needed       multivitamin w/minerals (MULTI-VITAMIN) tablet Take 1 tablet by mouth every morning       sertraline (ZOLOFT) 100 MG tablet every evening        temazepam (RESTORIL) 7.5 MG capsule nightly as needed          Family History  Family History   Problem Relation Age of Onset     Anesthesia Reaction No family hx of      Cardiovascular No family hx of      Deep Vein Thrombosis (DVT) No family hx of        The complete review of systems is negative other than noted in the HPI or here.       Anesthesia Pre-Procedure Evaluation    Patient: Jason Solis   MRN: [id number] : 1970        Preoperative Diagnosis: Sacroiliitis (H) [M46.1]    Procedure : Procedure(s):  Minimally invasive Right sacroiliac joint fusion  Video Visit       Past Medical History:   Diagnosis Date     Alcohol abuse      Anxiety      Bell's palsy      Cervicalgia      Gastroesophageal reflux disease without esophagitis      Insomnia      Irritable bowel syndrome      Sacroiliitis (H)      Tinnitus, unspecified laterality      Tobacco dependence syndrome      Ulnar nerve entrapment at elbow       Past Surgical History:   Procedure Laterality Date     COLONOSCOPY       TONSILLECTOMY        Allergies   Allergen Reactions     Duloxetine Visual Disturbance and Anxiety     Other reaction(s): Drowsy, Visual disturbance, Anxiety     Etodolac Rash      Social History     Tobacco Use     Smoking status: Current Some Day Smoker     Years: 10.00     Types: Cigars, Cigarettes     Smokeless tobacco: Never Used     Tobacco comment: occasional cigar (11/10/21); smoked cigarettes ~ 10 yrs   Substance Use  Topics     Alcohol use: Not on file      Wt Readings from Last 1 Encounters:   11/10/21 68.3 kg (150 lb 8 oz)             ROS/MED HX  The complete review of systems is negative other than noted in the HPI or here.  Patient denies recent illness, fever and respiratory infection during past month.  Pt denies steroid use during past year.    ENT/Pulmonary: Comment: Smokes occasional cigar and 1-2 cigarettes per week. Has 10 yr hx cigarettes      (+) tobacco use, Current use,  (-) asthma and sleep apnea   Neurologic: Comment: Hx right Bell's palsy    Tinnitus    Insomnia      (+) no peripheral neuropathy  (-) no seizures, no CVA and migraines   Cardiovascular:  - neg cardiovascular ROS     METS/Exercise Tolerance: 4 - Raking leaves, gardening Comment: Able to ascend stairs w/o difficulty. Denies SOB & CP.   Hematologic:  - neg hematologic  ROS  (-) history of blood clots and history of blood transfusion   Musculoskeletal: Comment: B/L carpal tunnel    Arthritis in knees, hips, neck (C5-6 herniation)    (+) arthritis,     GI/Hepatic: Comment: Occasional GERD w/ specific foods   (-) liver disease   Renal/Genitourinary:  - neg Renal ROS  (-) renal disease   Endo:  - neg endo ROS  (-) Type II DM   Psychiatric/Substance Use:     (+) psychiatric history anxiety alcohol abuse     Infectious Disease:  - neg infectious disease ROS     Malignancy:  - neg malignancy ROS     Other:  - neg other ROS          Physical Exam    Airway      Comment: Thick beard         Respiratory Devices and Support         Dental           Cardiovascular             Pulmonary             PAC Discussion and Assessment    ASA Classification: 2  Case is suitable for: [facility]        Virtual visit -  No vitals were obtained       Physical Exam  Constitutional: Awake, alert, cooperative, no apparent distress, and appears stated age.  Neurologic: Awake, alert, oriented to name, place and time.   Neuropsychiatric: Calm, cooperative. Normal affect. Answers  questions appropriately.    ** Patient's visit was done virtually today.  A full physical exam was not completed.  Please refer to the physical examination documented by the anesthesiologist in the anesthesia record on the day of surgery. **        PRIOR LABS/DIAGNOSTIC STUDIES:   All labs and imaging personally reviewed     3 views pelvis radiograph(s) 10/14/2021  IMPRESSION:  1. No substantial degenerative change at either sacroiliac joint.  2. Mild degenerative change of bilateral hips with loss of normal  sphericity of the bilateral femoral heads but relative preservation of  joint spaces.    CBC:   Lab Results   Component Value Date    WBC 4.7 11/10/2021    HGB 14.3 11/10/2021    HCT 44.1 11/10/2021     11/10/2021     BMP:   Lab Results   Component Value Date     11/10/2021    POTASSIUM 4.2 11/10/2021    CHLORIDE 106 11/10/2021    CO2 30 11/10/2021    BUN 11 11/10/2021    CR 1.01 11/10/2021    GLC 97 11/10/2021     COAGS: No results found for: PTT, INR, FIBR  POC: No results found for: BGM, HCG, HCGS  HEPATIC: No results found for: ALBUMIN, PROTTOTAL, ALT, AST, GGT, ALKPHOS, BILITOTAL, BILIDIRECT, MINESH  OTHER:   Lab Results   Component Value Date    GABRIELA 9.1 11/10/2021       The patient's records and results personally reviewed by this provider.       COVID19 testing scheduled on 12/16/21    ASSESSMENT and PLAN  Jason Solis is a 50 y/o male who presents for pre-operative H&P in preparation for Minimally invasive Right sacroiliac joint fusion with Yvan Henry MD on 12/20/21 at [facility] for treatment of Sacroiliitis (H). This procedure was initially scheduled on 12/6/21, but was postponed due to an unexpected change in Dr Henry's schedule.    Pt has had prior anesthetic.  No history of anesthetic complications.    He has the following specific operative considerations:   # RASHIDA 2/8 = low risk  # VTE risk: 0.26%  # Risk of PONV score = 1.  If > 2,  anti-emetic intervention recommended.  # Anesthesia considerations:  Refer to PAC assessment in anesthesia records        CARDIAC: METS 4 -  Able to ascend stairs w/o difficulty. Denies SOB & CP. Endorses LBP when walking >2 blocks.     # RCRI : No serious cardiac risks.  0.4% risk of major adverse cardiac event.       PULMONARY:     # Current smoker, smokes occasional cigar, smokes 2-3 cigarettes per day. Endorses 10 yr smoking hx. Lungs CTA on exam.    # Denies asthma or inhaler use     GI:     # Occasional GERD w/ specific foods      ENDO: BMI 25    # No DM     ORTHO:     # full ROM of neck    # Sacroiliitis    # B/L carpal tunnel    # Arthritis in knees, hips, neck (C5-6 herniation per pt)    # Ulnar nerve entrapment of elbow     NEURO/PSYCH:     # Hx right Bell's palsy    # Tinnitus    # Insomnia    Patient is optimized and is acceptable candidate for the proposed procedure. No further diagnostic evaluation is needed.      ** Patient's visit was done virtually today.  A full physical exam was not completed.  Please refer to the physical examination documented by the anesthesiologist in the anesthesia record on the day of surgery. **    Final plan per anesthesiologist on day of surgery.     Arrival time, NPO, shower and medication instructions provided by nursing staff today.  Preparing For Your Surgery handout given.        On the day of service:     Prep time: 8 minutes  Visit time: 4 minutes  Documentation time: 10 minutes  ------------------------------------------  Total time: 22 minutes      Sheba Pascual PA-C  Preoperative Assessment Center  [facility]  Clinic and Surgery Center  Phone: [phone]  Fax: [phone]

## 2021-12-07 NOTE — ANESTHESIA PREPROCEDURE EVALUATION
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3 kg (150 lb 8 oz)        Anesthesia Evaluation   Pt has had prior anesthetic. Type: MAC.    No history of anesthetic complications       ROS/MED HX  ENT/Pulmonary: Comment: Smokes occasional cigar and 1-2 cigarettes per week. Has 10 yr hx cigarettes      (+) tobacco use, Current use,  (-) asthma and sleep apnea   Neurologic: Comment: Hx right Bell's palsy    Tinnitus    Insomnia      (+) no peripheral neuropathy  (-) no seizures, no CVA and migraines   Cardiovascular:  - neg cardiovascular ROS     METS/Exercise Tolerance: 4 - Raking leaves, gardening Comment: Able to ascend stairs w/o difficulty. Denies SOB & CP.   Hematologic:  - neg hematologic  ROS  (-) history of blood clots and history of blood transfusion   Musculoskeletal: Comment: B/L carpal  tunnel    Arthritis in knees, hips, neck (C5-6 herniation)    (+) arthritis,     GI/Hepatic: Comment: Occasional GERD w/ specific foods   (-) liver disease   Renal/Genitourinary:  - neg Renal ROS  (-) renal disease   Endo:  - neg endo ROS  (-) Type II DM   Psychiatric/Substance Use:     (+) psychiatric history anxiety alcohol abuse     Infectious Disease:  - neg infectious disease ROS     Malignancy:  - neg malignancy ROS     Other:  - neg other ROS          Physical Exam    Airway      Comment: Thick beard    Mallampati: I   TM distance: > 3 FB   Neck ROM: full   Mouth opening: > 3 cm    Respiratory Devices and Support  Comment: Breathing effortlessly on room air       Dental     Comment: Dental : no acute issues        Cardiovascular          Rhythm and rate: regular and normal     Pulmonary           breath sounds clear to auscultation       Other findings: Allergies:   -- Duloxetine -- Visual Disturbance and Anxiety    --  Other reaction(s): Drowsy, Visual disturbance,             Anxiety   -- Etodolac -- Rash    Current Facility-Administered Medications:  ceFAZolin (ANCEF) intermittent infusion 2 g in 100 mL dextrose PRE-MIX  ceFAZolin (ANCEF) intermittent infusion 2 g in 100 mL dextrose PRE-MIX  lactated ringers infusion  Medications Prior to Admission:  acetaminophen (TYLENOL) 500 MG tablet, Take 1,000 mg by mouth every 6 hours as needed for mild pain, Disp: , Rfl: , Past Week at Unknown time  cyclobenzaprine (FLEXERIL) 10 MG tablet, as needed, Disp: , Rfl: , 12/19/2021 at 2000  ibuprofen (ADVIL/MOTRIN) 100 MG tablet, Take 800 mg by mouth every 6 hours as needed , Disp: , Rfl: , 12/13/2021 at Unknown time  multivitamin w/minerals (MULTI-VITAMIN) tablet, Take 1 tablet by mouth every morning, Disp: , Rfl: , 12/13/2021 at Unknown time  sertraline (ZOLOFT) 100 MG tablet, 50 mg every evening , Disp: , Rfl: , 12/19/2021 at 2000  temazepam (RESTORIL) 7.5 MG capsule, nightly as needed , Disp: , Rfl: , 12/19/2021 at  2000        OUTSIDE LABS:  CBC:   Lab Results   Component Value Date    WBC 4.7 11/10/2021    HGB 14.3 11/10/2021    HCT 44.1 11/10/2021     11/10/2021     BMP:   Lab Results   Component Value Date     11/10/2021    POTASSIUM 4.2 11/10/2021    CHLORIDE 106 11/10/2021    CO2 30 11/10/2021    BUN 11 11/10/2021    CR 1.01 11/10/2021    GLC 97 11/10/2021     COAGS: No results found for: PTT, INR, FIBR  POC: No results found for: BGM, HCG, HCGS  HEPATIC: No results found for: ALBUMIN, PROTTOTAL, ALT, AST, GGT, ALKPHOS, BILITOTAL, BILIDIRECT, MINESH  OTHER:   Lab Results   Component Value Date    GABRIELA 9.1 11/10/2021       Anesthesia Plan    ASA Status:  2   NPO Status:  NPO Appropriate    Anesthesia Type: General.     - Airway: ETT   Induction: Intravenous, Propofol.   Maintenance: Balanced.        Consents    Anesthesia Plan(s) and associated risks, benefits, and realistic alternatives discussed. Questions answered and patient/representative(s) expressed understanding.     - Discussed: Risks, Benefits and Alternatives for BOTH SEDATION and the PROCEDURE were discussed     - Discussed with:  Patient      - Extended Intubation/Ventilatory Support Discussed: No.      - Patient is DNR/DNI Status: No    Use of blood products discussed: No .     Postoperative Care    Pain management: IV analgesics.   PONV prophylaxis: Ondansetron (or other 5HT-3), Dexamethasone or Solumedrol     Comments:    Other Comments: Jason requests anesthesia. Procedures and risks explained. He understood and consented. Qs answered.           PAC Discussion and Assessment    ASA Classification: 2  Case is suitable for: [facility] [place]                    PAC Resident/NP Anesthesia Assessment: Jason Solis is a 50 y/o male who presents for pre-operative H&P in preparation for Minimally invasive Right sacroiliac joint fusion with Yvan Henry MD on 12/20/21 at [facility] for treatment of Sacroiliitis  (H). This procedure was initially scheduled on 12/6/21, but was postponed due to an unexpected change in Dr Henry's schedule.    Pt has had prior anesthetic.  No history of anesthetic complications.    He has the following specific operative considerations:   # RASHIDA 2/8 = low risk  # VTE risk: 0.26%  # Risk of PONV score = 1.  If > 2, anti-emetic intervention recommended.  # Anesthesia considerations:  Refer to PAC assessment in anesthesia records        CARDIAC: METS 4 -  Able to ascend stairs w/o difficulty. Denies SOB & CP. Endorses LBP when walking >2 blocks.     # RCRI : No serious cardiac risks.  0.4% risk of major adverse cardiac event.       PULMONARY:     # Current smoker, smokes occasional cigar, smokes 2-3 cigarettes per day. Endorses 10 yr smoking hx. Lungs CTA on exam.    # Denies asthma or inhaler use     GI:     # Occasional GERD w/ specific foods      ENDO: BMI 25    # No DM     ORTHO:     # full ROM of neck    # Sacroiliitis    # B/L carpal tunnel    # Arthritis in knees, hips, neck (C5-6 herniation per pt)    # Ulnar nerve entrapment of elbow     NEURO/PSYCH:     # Hx right Bell's palsy    # Tinnitus    # Insomnia    Patient is optimized and is acceptable candidate for the proposed procedure. No further diagnostic evaluation is needed.      ** Patient's visit was done virtually today.  A full physical exam was not completed.  Please refer to the physical examination documented by the anesthesiologist in the anesthesia record on the day of surgery. **    Final plan per anesthesiologist on day of surgery.     Reviewed and Signed by PAC Mid-Level Provider/Resident  Mid-Level Provider/Resident: Sheba Pascual PA-C  Date: 12/7/21  Time: 0800                               Sheba Pascual PA-C

## 2021-12-07 NOTE — H&P
Pre-Operative H & P     CC:  Preoperative exam to assess for increased cardiopulmonary risk while undergoing surgery and anesthesia.    Date of Encounter: 12/7/2021  Primary Care Physician:  [facility], [facility]     Reason for Visit: Sacroiliitis    Video-Visit Details    Type of service:  Video Visit    Patient verbally consented to video service today: YES    Video Start Time: 0743  Video End Time (time video stopped): 0746    Originating Location (pt. Location): Home    Distant Location (provider location):  [facility] PREOPERATIVE ASS[facility]     Mode of Communication:  Video Conference via [employer]      [facility]  Jason Solis is a 52 y/o male who presents for pre-operative H&P in preparation for Minimally invasive Right sacroiliac joint fusion with Yvan Henry MD on 12/20/21 at [facility] for treatment of Sacroiliitis (H). This procedure was initially scheduled on 12/6/21, but was postponed due to an unexpected change in Dr Henry's schedule.    Patient is being evaluated for comorbid conditions of tobacco use, anxiety, tinnitus, hx head injury, [place] palsy, insomnia, GERD, IBS, ulnar nerve entrapment at elbow, cervicalgia.      Mr. Solis has a longstanding history of right-sided low back pain.  The patient reports that he initially started to have left-sided low back pain with pain radiating down his leg in the 1990s that  was intermittent and eventually subsided.  He now has had approximately 1 to 2 years of sustained right low back pain with some radiation down his right leg. He had a few injections into the SI joint with very good short-term relief but no sustained relief from these injections. He at this point is not able to participate in the activities he enjoys doing.  He has a tough time even sitting on his right buttock because of the sensitivity and pain. He now presents for the above procedure.     History was obtained from patient & chart  review.     Hx of abnormal bleeding or anti-platelet use: denies    Menstrual history: No LMP for male patient.:      Prior to Admission Medications  Current Outpatient Medications   Medication Sig Dispense Refill     cyclobenzaprine (FLEXERIL) 10 MG tablet as needed       ibuprofen (ADVIL/MOTRIN) 100 MG tablet Take 100 mg by mouth every 4 hours as needed       multivitamin w/minerals (MULTI-VITAMIN) tablet Take 1 tablet by mouth every morning       sertraline (ZOLOFT) 100 MG tablet every evening        temazepam (RESTORIL) 7.5 MG capsule nightly as needed          Family History  Family History   Problem Relation Age of Onset     Anesthesia Reaction No family hx of      Cardiovascular No family hx of      Deep Vein Thrombosis (DVT) No family hx of        The complete review of systems is negative other than noted in the HPI or here.       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       Past Surgical History:   Procedure Laterality Date     COLONOSCOPY       TONSILLECTOMY        Allergies   Allergen Reactions     Duloxetine Visual Disturbance and Anxiety     Other reaction(s): Drowsy, Visual disturbance, Anxiety     Etodolac Rash      Social History     Tobacco Use     Smoking status: Current Some Day Smoker     Years: 10.00     Types: Cigars, Cigarettes     Smokeless tobacco: Never Used     Tobacco comment: occasional cigar (11/10/21); smoked cigarettes ~ 10 yrs   Substance Use  Topics     Alcohol use: Not on file      Wt Readings from Last 1 Encounters:   11/10/21 68.3 kg (150 lb 8 oz)             ROS/MED HX  The complete review of systems is negative other than noted in the HPI or here.  Patient denies recent illness, fever and respiratory infection during past month.  Pt denies steroid use during past year.    ENT/Pulmonary: Comment: Smokes occasional cigar and 1-2 cigarettes per week. Has 10 yr hx cigarettes      (+) tobacco use, Current use,  (-) asthma and sleep apnea   Neurologic: Comment: Hx right Bell's palsy    Tinnitus    Insomnia      (+) no peripheral neuropathy  (-) no seizures, no CVA and migraines   Cardiovascular:  - neg cardiovascular ROS     METS/Exercise Tolerance: 4 - Raking leaves, gardening Comment: Able to ascend stairs w/o difficulty. Denies SOB & CP.   Hematologic:  - neg hematologic  ROS  (-) history of blood clots and history of blood transfusion   Musculoskeletal: Comment: B/L carpal tunnel    Arthritis in knees, hips, neck (C5-6 herniation)    (+) arthritis,     GI/Hepatic: Comment: Occasional GERD w/ specific foods   (-) liver disease   Renal/Genitourinary:  - neg Renal ROS  (-) renal disease   Endo:  - neg endo ROS  (-) Type II DM   Psychiatric/Substance Use:     (+) psychiatric history anxiety alcohol abuse     Infectious Disease:  - neg infectious disease ROS     Malignancy:  - neg malignancy ROS     Other:  - neg other ROS          Physical Exam    Airway      Comment: Thick beard         Respiratory Devices and Support         Dental           Cardiovascular             Pulmonary             PAC Discussion and Assessment    ASA Classification: 2  Case is suitable for: [facility] [place]        Virtual visit -  No vitals were obtained       Physical Exam  Constitutional: Awake, alert, cooperative, no apparent distress, and appears stated age.  Neurologic: Awake, alert, oriented to name, place and time.   Neuropsychiatric: Calm, cooperative. Normal affect. Answers  questions appropriately.    ** Patient's visit was done virtually today.  A full physical exam was not completed.  Please refer to the physical examination documented by the anesthesiologist in the anesthesia record on the day of surgery. **        PRIOR LABS/DIAGNOSTIC STUDIES:   All labs and imaging personally reviewed     3 views pelvis radiograph(s) 10/14/2021  IMPRESSION:  1. No substantial degenerative change at either sacroiliac joint.  2. Mild degenerative change of bilateral hips with loss of normal  sphericity of the bilateral femoral heads but relative preservation of  joint spaces.    CBC:   Lab Results   Component Value Date    WBC 4.7 11/10/2021    HGB 14.3 11/10/2021    HCT 44.1 11/10/2021     11/10/2021     BMP:   Lab Results   Component Value Date     11/10/2021    POTASSIUM 4.2 11/10/2021    CHLORIDE 106 11/10/2021    CO2 30 11/10/2021    BUN 11 11/10/2021    CR 1.01 11/10/2021    GLC 97 11/10/2021     COAGS: No results found for: PTT, INR, FIBR  POC: No results found for: BGM, HCG, HCGS  HEPATIC: No results found for: ALBUMIN, PROTTOTAL, ALT, AST, GGT, ALKPHOS, BILITOTAL, BILIDIRECT, MINESH  OTHER:   Lab Results   Component Value Date    GABRIELA 9.1 11/10/2021       The patient's records and results personally reviewed by this provider.       COVID19 testing scheduled on 12/16/21    ASSESSMENT and PLAN  Jason Solis is a 52 y/o male who presents for pre-operative H&P in preparation for Minimally invasive Right sacroiliac joint fusion with Yvan Henry MD on 12/20/21 at [facility] for treatment of Sacroiliitis (H). This procedure was initially scheduled on 12/6/21, but was postponed due to an unexpected change in Dr Henry's schedule.    Pt has had prior anesthetic.  No history of anesthetic complications.    He has the following specific operative considerations:   # RASHIDA 2/8 = low risk  # VTE risk: 0.26%  # Risk of PONV score = 1.  If > 2,  anti-emetic intervention recommended.  # Anesthesia considerations:  Refer to PAC assessment in anesthesia records        CARDIAC: METS 4 -  Able to ascend stairs w/o difficulty. Denies SOB & CP. Endorses LBP when walking >2 blocks.     # RCRI : No serious cardiac risks.  0.4% risk of major adverse cardiac event.       PULMONARY:     # Current smoker, smokes occasional cigar, smokes 2-3 cigarettes per day. Endorses 10 yr smoking hx. Lungs CTA on exam.    # Denies asthma or inhaler use     GI:     # Occasional GERD w/ specific foods      ENDO: BMI 25    # No DM     ORTHO:     # full ROM of neck    # Sacroiliitis    # B/L carpal tunnel    # Arthritis in knees, hips, neck (C5-6 herniation per pt)    # Ulnar nerve entrapment of elbow     NEURO/PSYCH:     # Hx right Bell's palsy    # Tinnitus    # Insomnia    Patient is optimized and is acceptable candidate for the proposed procedure. No further diagnostic evaluation is needed.      ** Patient's visit was done virtually today.  A full physical exam was not completed.  Please refer to the physical examination documented by the anesthesiologist in the anesthesia record on the day of surgery. **    Final plan per anesthesiologist on day of surgery.     Arrival time, NPO, shower and medication instructions provided by nursing staff today.  Preparing For Your Surgery handout given.        On the day of service:     Prep time: 8 minutes  Visit time: 4 minutes  Documentation time: 10 minutes  ------------------------------------------  Total time: 22 minutes      Sheba Pascual PA-C  Preoperative Assessment Center  [facility]  Clinic and Surgery Center  Phone: [phone]  Fax: [phone]

## 2021-12-07 NOTE — PATIENT INSTRUCTIONS
Preparing for Your Surgery      Name:  Jason Solis   MRN:  [id number]   :  1970   Today's Date:  2021       Arriving for surgery:  Surgery date:  2021  Arrival time:  5:30 am    Restrictions due to COVID 19:       One visitor is allowed in the Pre Op area.       When you go into surgery, one visitor is allowed to wait in the Surgery Waiting Room       (provided there is enough space to social distance).         In hospital patients are allowed 1 visitor per day       The visitor may change daily     Visiting Hours: 8 am - 8:30 pm   No ill visitors.   All visitors must wear face mask.    [employer] parking is available for anyone with mobility limitations or disabilities.  ([place]  24 hours/ 7 days a week; [facility]  7 am- 3:30 pm, Mon- Fri)    Please come to:     [facility] [address]  [facility]   [address] [place]  [address]    -Parking is available in the Green Ramp     -Proceed to the 3rd floor, check in at the Adult Surgery Waiting Lounge. [phone]    If an escort is needed stop at the Information Desk in the lobby.         What can I eat or drink?  -  You may eat and drink normally for up to 8 hours before your surgery. (Until 21 at 11 pm)  -  You may have clear liquids until 2 hours before surgery. (Until 5:30 am arrival time)    Examples of clear liquids:  Water  Clear broth  Juices (apple, white grape, white cranberry  and cider) without pulp  Noncarbonated, powder based beverages  (lemonade and Dominic-Aid)  Sodas (Sprite, 7-Up, ginger ale and seltzer)  Coffee or tea (without milk or cream)  Gatorade    -  No Alcohol for at least 24 hours before surgery     Which medicines can I take?    Hold Aspirin for 7 days before surgery.     Hold Multivitamins for 7 days before surgery.  Hold Supplements for 7 days before surgery.    Hold Ibuprofen (Advil, Motrin) for 1 day before surgery  Hold  Naproxen (Aleve) for 4 days before surgery.      -  PLEASE TAKE these medications the day of surgery:  Cyclobenzaprine if needed       How do I prepare myself?  - Please take 2 showers before surgery using Scrubcare or Hibiclens soap.    Use this soap only from the neck to your toes.     Leave the soap on your skin for one minute--then rinse thoroughly.      You may use your own shampoo and conditioner; no other hair products.   - Please remove all jewelry and body piercings.  - No lotions, deodorants or fragrance.  - No makeup or fingernail polish.   - Bring your ID and insurance card.    -If you have a Deep Brain Stimulator, Spinal Cord Stimulator or any neuro stimulator device---you must bring the remote control to the hospital     - All patients are required to have a Covid-19 test within 4 days of surgery/procedure.      -Patients will be contacted by the [facility] scheduling team within 1 week of surgery to make an appointment.      - Patients may call the Scheduling team at [phone] if they have not been scheduled within 4 days of  surgery.      ALL PATIENTS GOING HOME THE SAME DAY OF SURGERY ARE REQUIRED TO HAVE A RESPONSIBLE ADULT TO DRIVE AND BE IN ATTENDANCE WITH THEM FOR 24 HOURS FOLLOWING SURGERY.      Questions or Concerns:    - For any questions regarding the day of surgery or your hospital stay, please contact the Pre Admission Nursing Office at [phone].       - If you have health changes between today and your surgery please call your surgeon.       For questions after surgery please call your surgeons office.

## 2021-12-10 ENCOUNTER — PRE VISIT (OUTPATIENT)
Dept: SURGERY | Facility: CLINIC | Age: 51
End: 2021-12-10

## 2021-12-12 ENCOUNTER — HEALTH MAINTENANCE LETTER (OUTPATIENT)
Age: 51
End: 2021-12-12

## 2021-12-16 ENCOUNTER — LAB (OUTPATIENT)
Dept: LAB | Facility: CLINIC | Age: 51
End: 2021-12-16
Payer: COMMERCIAL

## 2021-12-16 DIAGNOSIS — Z11.59 ENCOUNTER FOR SCREENING FOR OTHER VIRAL DISEASES: ICD-10-CM

## 2021-12-16 LAB — SARS-COV-2 RNA RESP QL NAA+PROBE: NEGATIVE

## 2021-12-16 PROCEDURE — U0003 INFECTIOUS AGENT DETECTION BY NUCLEIC ACID (DNA OR RNA); SEVERE ACUTE RESPIRATORY SYNDROME CORONAVIRUS 2 (SARS-COV-2) (CORONAVIRUS DISEASE [COVID-19]), AMPLIFIED PROBE TECHNIQUE, MAKING USE OF HIGH THROUGHPUT TECHNOLOGIES AS DESCRIBED BY CMS-2020-01-R: HCPCS

## 2021-12-16 PROCEDURE — U0005 INFEC AGEN DETEC AMPLI PROBE: HCPCS

## 2021-12-17 ENCOUNTER — TELEPHONE (OUTPATIENT)
Dept: ORTHOPEDICS | Facility: CLINIC | Age: 51
End: 2021-12-17

## 2021-12-17 ENCOUNTER — THERAPY VISIT (OUTPATIENT)
Dept: PHYSICAL THERAPY | Facility: CLINIC | Age: 51
End: 2021-12-17
Attending: ORTHOPAEDIC SURGERY
Payer: COMMERCIAL

## 2021-12-17 DIAGNOSIS — M46.1 SACROILIITIS (H): ICD-10-CM

## 2021-12-17 DIAGNOSIS — G89.29 CHRONIC RIGHT-SIDED LOW BACK PAIN WITHOUT SCIATICA: ICD-10-CM

## 2021-12-17 DIAGNOSIS — M54.50 CHRONIC RIGHT-SIDED LOW BACK PAIN WITHOUT SCIATICA: ICD-10-CM

## 2021-12-17 PROCEDURE — 97161 PT EVAL LOW COMPLEX 20 MIN: CPT | Mod: GP | Performed by: PHYSICAL THERAPIST

## 2021-12-17 PROCEDURE — 97110 THERAPEUTIC EXERCISES: CPT | Mod: GP | Performed by: PHYSICAL THERAPIST

## 2021-12-17 NOTE — LETTER
Return to Work  2021     Seen today: no    Patient:  Jason Solis  :   1970  MRN:     [id number]  Physician: YVAN MAHAJAN    Jason Solis may return to work on Date: 21.      The next clinic appointment is scheduled for 22.    Patient limitations:  Full Time but may decrease hours if not tolerated.    Work from Home.  No Lifting >10#.  No Full weight Bearing Right Leg.       Emailed to patient per his request.      Electronically signed by Yvan Mahajan MD

## 2021-12-17 NOTE — PROGRESS NOTES
Physical Therapy Initial Evaluation  Subjective:  The history is provided by the patient. No  was used.   Patient Health History  Jason Solis being seen for Pre visit for up coming si joint fusion.     Problem began: 11/14/2019.   Problem occurred: On going back injuryp   Pain is reported as 6/10 on pain scale.  General health as reported by patient is good.  Pertinent medical history includes: none.     Medical allergies: none.   Surgeries include:  Other. Other surgery history details: Tonsil surgery.    Current medications:  Anti-depressants, muscle relaxants and sleep medication.    Current occupation is Lpn.   Primary job tasks include:  Computer work and prolonged sitting.                  Therapist Generated HPI Evaluation  Problem details: Pt is schedule for R SIJ fusion on 12/20/21..         Type of problem:  Lumbar.    This is a chronic condition.                                             Objective:  System         Lumbar/SI Evaluation  ROM:    AROM Lumbar:   Flexion:          100%  Ext:                    50%   Side Bend:        Left:  75%    Right:  75%  Rotation:           Left:     Right:   Side Glide:        Left:     Right:                                                                          General     ROS    Assessment/Plan:    Patient is a 51 year old male with lumbar complaints.    Patient has the following significant findings with corresponding treatment plan.                Diagnosis 1:  Pre-op for SIJ fusion  Pain -  self management, education, directional preference exercise and home program  Decreased strength - therapeutic exercise and therapeutic activities  Impaired muscle performance - neuro re-education  Decreased function - therapeutic activities    Therapy Evaluation Codes:      1) Clinical presentation characteristics are:   Stable/Uncomplicated.  2) Decision-Making    Low complexity using standardized patient assessment instrument and/or measureable  assessment of functional outcome.  Cumulative Therapy Evaluation is: Low complexity.    Previous and current functional limitations:  (See Goal Flow Sheet for this information)    Short term and Long term goals: (See Goal Flow Sheet for this information)     Communication ability:  Patient appears to be able to clearly communicate and understand verbal and written communication and follow directions correctly.  Treatment Explanation - The following has been discussed with the patient:   RX ordered/plan of care  Anticipated outcomes  Possible risks and side effects  This patient would benefit from PT intervention to resume normal activities.   Rehab potential is good.    Frequency:  1 X week, once daily  Duration:  for 1 weeks  Discharge Plan:  Achieve all LTG.  Independent in home treatment program.  Reach maximal therapeutic benefit.    Please refer to the daily flowsheet for treatment today, total treatment time and time spent performing 1:1 timed codes.

## 2021-12-17 NOTE — TELEPHONE ENCOUNTER
Return to work letter requested by patient for 12/27. 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 DOS 12/20/21 so pt may go back to work later if not feeling well enough    Doris Gaspar

## 2021-12-20 ENCOUNTER — APPOINTMENT (OUTPATIENT)
Dept: GENERAL RADIOLOGY | Facility: CLINIC | Age: 51
End: 2021-12-20
Attending: ORTHOPAEDIC SURGERY
Payer: COMMERCIAL

## 2021-12-20 ENCOUNTER — HOSPITAL ENCOUNTER (OUTPATIENT)
Facility: CLINIC | Age: 51
Discharge: HOME | End: 2021-12-20
Attending: ORTHOPAEDIC SURGERY | Admitting: ORTHOPAEDIC SURGERY
Payer: COMMERCIAL

## 2021-12-20 ENCOUNTER — ANESTHESIA (OUTPATIENT)
Dept: SURGERY | Facility: CLINIC | Age: 51
End: 2021-12-20
Payer: COMMERCIAL

## 2021-12-20 VITALS
HEART RATE: 71 BPM | BODY MASS INDEX: 24.06 KG/M2 | SYSTOLIC BLOOD PRESSURE: 138 MMHG | HEIGHT: 66 IN | WEIGHT: 149.69 LBS | DIASTOLIC BLOOD PRESSURE: 83 MMHG | OXYGEN SATURATION: 99 % | TEMPERATURE: 98 F | RESPIRATION RATE: 16 BRPM

## 2021-12-20 DIAGNOSIS — M54.50 CHRONIC RIGHT-SIDED LOW BACK PAIN WITHOUT SCIATICA: Primary | ICD-10-CM

## 2021-12-20 DIAGNOSIS — G89.29 CHRONIC RIGHT-SIDED LOW BACK PAIN WITHOUT SCIATICA: Primary | ICD-10-CM

## 2021-12-20 PROBLEM — F41.9 ANXIETY DISORDER: Status: ACTIVE | Noted: 2021-12-20

## 2021-12-20 PROBLEM — F43.12 POST-TRAUMATIC STRESS DISORDER, CHRONIC: Status: ACTIVE | Noted: 2021-12-20

## 2021-12-20 PROBLEM — M25.569 KNEE PAIN: Status: ACTIVE | Noted: 2021-12-20

## 2021-12-20 LAB
ABO/RH(D): NORMAL
ANTIBODY SCREEN: NEGATIVE
CREAT SERPL-MCNC: 1.03 MG/DL (ref 0.66–1.25)
GFR SERPL CREATININE-BSD FRML MDRD: 83 ML/MIN/1.73M2
GLUCOSE BLDC GLUCOMTR-MCNC: 94 MG/DL (ref 70–99)
HGB BLD-MCNC: 12.8 G/DL (ref 13.3–17.7)
PLATELET # BLD AUTO: 257 10E3/UL (ref 150–450)
POTASSIUM BLD-SCNC: 3.6 MMOL/L (ref 3.4–5.3)
SPECIMEN EXPIRATION DATE: NORMAL

## 2021-12-20 PROCEDURE — 250N000011 HC RX IP 250 OP 636: Performed by: PHYSICIAN ASSISTANT

## 2021-12-20 PROCEDURE — 250N000009 HC RX 250: Performed by: NURSE ANESTHETIST, CERTIFIED REGISTERED

## 2021-12-20 PROCEDURE — 250N000013 HC RX MED GY IP 250 OP 250 PS 637: Performed by: PHYSICIAN ASSISTANT

## 2021-12-20 PROCEDURE — 360N000086 HC SURGERY LEVEL 6 W/ FLUORO, PER MIN: Performed by: ORTHOPAEDIC SURGERY

## 2021-12-20 PROCEDURE — 258N000003 HC RX IP 258 OP 636: Performed by: NURSE ANESTHETIST, CERTIFIED REGISTERED

## 2021-12-20 PROCEDURE — 272N000001 HC OR GENERAL SUPPLY STERILE: Performed by: ORTHOPAEDIC SURGERY

## 2021-12-20 PROCEDURE — 250N000011 HC RX IP 250 OP 636: Performed by: NURSE ANESTHETIST, CERTIFIED REGISTERED

## 2021-12-20 PROCEDURE — 710N000012 HC RECOVERY PHASE 2, PER MINUTE: Performed by: ORTHOPAEDIC SURGERY

## 2021-12-20 PROCEDURE — 999N000141 HC STATISTIC PRE-PROCEDURE NURSING ASSESSMENT: Performed by: ORTHOPAEDIC SURGERY

## 2021-12-20 PROCEDURE — 27279 ARTHRD SI JT PLMT TARTCLR DV: CPT | Mod: RT | Performed by: ORTHOPAEDIC SURGERY

## 2021-12-20 PROCEDURE — 85049 AUTOMATED PLATELET COUNT: CPT | Performed by: ANESTHESIOLOGY

## 2021-12-20 PROCEDURE — 710N000010 HC RECOVERY PHASE 1, LEVEL 2, PER MIN: Performed by: ORTHOPAEDIC SURGERY

## 2021-12-20 PROCEDURE — 250N000013 HC RX MED GY IP 250 OP 250 PS 637: Performed by: STUDENT IN AN ORGANIZED HEALTH CARE EDUCATION/TRAINING PROGRAM

## 2021-12-20 PROCEDURE — 250N000009 HC RX 250: Performed by: ORTHOPAEDIC SURGERY

## 2021-12-20 PROCEDURE — 999N000180 XR SURGERY CARM FLUORO LESS THAN 5 MIN: Mod: TC

## 2021-12-20 PROCEDURE — C1713 ANCHOR/SCREW BN/BN,TIS/BN: HCPCS | Performed by: ORTHOPAEDIC SURGERY

## 2021-12-20 PROCEDURE — 370N000017 HC ANESTHESIA TECHNICAL FEE, PER MIN: Performed by: ORTHOPAEDIC SURGERY

## 2021-12-20 PROCEDURE — 250N000011 HC RX IP 250 OP 636: Performed by: ANESTHESIOLOGY

## 2021-12-20 PROCEDURE — 85018 HEMOGLOBIN: CPT | Performed by: ANESTHESIOLOGY

## 2021-12-20 PROCEDURE — 86900 BLOOD TYPING SEROLOGIC ABO: CPT | Performed by: ANESTHESIOLOGY

## 2021-12-20 PROCEDURE — 82565 ASSAY OF CREATININE: CPT | Performed by: ANESTHESIOLOGY

## 2021-12-20 PROCEDURE — 82962 GLUCOSE BLOOD TEST: CPT

## 2021-12-20 PROCEDURE — 84132 ASSAY OF SERUM POTASSIUM: CPT | Performed by: ANESTHESIOLOGY

## 2021-12-20 PROCEDURE — 250N000013 HC RX MED GY IP 250 OP 250 PS 637: Performed by: ANESTHESIOLOGY

## 2021-12-20 PROCEDURE — 250N000025 HC SEVOFLURANE, PER MIN: Performed by: ORTHOPAEDIC SURGERY

## 2021-12-20 PROCEDURE — 36415 COLL VENOUS BLD VENIPUNCTURE: CPT | Performed by: ANESTHESIOLOGY

## 2021-12-20 DEVICE — IMPLANTABLE DEVICE: Type: IMPLANTABLE DEVICE | Site: SPINE LUMBAR | Status: FUNCTIONAL

## 2021-12-20 RX ORDER — ACETAMINOPHEN 500 MG
1000 TABLET ORAL EVERY 6 HOURS PRN
COMMUNITY
End: 2022-02-11

## 2021-12-20 RX ORDER — PROCHLORPERAZINE MALEATE 10 MG
10 TABLET ORAL EVERY 6 HOURS PRN
Status: DISCONTINUED | OUTPATIENT
Start: 2021-12-20 | End: 2021-12-20 | Stop reason: HOSPADM

## 2021-12-20 RX ORDER — OXYCODONE HYDROCHLORIDE 10 MG/1
10 TABLET ORAL EVERY 4 HOURS PRN
Status: DISCONTINUED | OUTPATIENT
Start: 2021-12-20 | End: 2021-12-20 | Stop reason: HOSPADM

## 2021-12-20 RX ORDER — ONDANSETRON 2 MG/ML
INJECTION INTRAMUSCULAR; INTRAVENOUS PRN
Status: DISCONTINUED | OUTPATIENT
Start: 2021-12-20 | End: 2021-12-20

## 2021-12-20 RX ORDER — METHOCARBAMOL 750 MG/1
750 TABLET, FILM COATED ORAL EVERY 6 HOURS PRN
Qty: 60 TABLET | Refills: 0 | Status: SHIPPED | OUTPATIENT
Start: 2021-12-20 | End: 2021-12-20

## 2021-12-20 RX ORDER — DEXMEDETOMIDINE HYDROCHLORIDE 4 UG/ML
INJECTION, SOLUTION INTRAVENOUS PRN
Status: DISCONTINUED | OUTPATIENT
Start: 2021-12-20 | End: 2021-12-20

## 2021-12-20 RX ORDER — ONDANSETRON 2 MG/ML
4 INJECTION INTRAMUSCULAR; INTRAVENOUS EVERY 6 HOURS PRN
Status: DISCONTINUED | OUTPATIENT
Start: 2021-12-20 | End: 2021-12-20 | Stop reason: HOSPADM

## 2021-12-20 RX ORDER — ACETAMINOPHEN 325 MG/1
650 TABLET ORAL EVERY 4 HOURS PRN
Qty: 50 TABLET | Refills: 0 | Status: SHIPPED | OUTPATIENT
Start: 2021-12-20 | End: 2022-03-25

## 2021-12-20 RX ORDER — CEFAZOLIN SODIUM 2 G/100ML
2 INJECTION, SOLUTION INTRAVENOUS SEE ADMIN INSTRUCTIONS
Status: DISCONTINUED | OUTPATIENT
Start: 2021-12-20 | End: 2021-12-20 | Stop reason: HOSPADM

## 2021-12-20 RX ORDER — ACETAMINOPHEN 325 MG/1
650 TABLET ORAL EVERY 4 HOURS PRN
Status: DISCONTINUED | OUTPATIENT
Start: 2021-12-23 | End: 2021-12-20 | Stop reason: HOSPADM

## 2021-12-20 RX ORDER — HYDROXYZINE HYDROCHLORIDE 25 MG/1
25 TABLET, FILM COATED ORAL EVERY 6 HOURS PRN
Qty: 30 TABLET | Refills: 0 | Status: SHIPPED | OUTPATIENT
Start: 2021-12-20 | End: 2022-02-11

## 2021-12-20 RX ORDER — OXYCODONE HYDROCHLORIDE 5 MG/1
5 TABLET ORAL EVERY 4 HOURS PRN
Status: DISCONTINUED | OUTPATIENT
Start: 2021-12-20 | End: 2021-12-20 | Stop reason: HOSPADM

## 2021-12-20 RX ORDER — AMOXICILLIN 250 MG
1 CAPSULE ORAL DAILY
Qty: 30 TABLET | Refills: 0 | Status: SHIPPED | OUTPATIENT
Start: 2021-12-20 | End: 2022-02-11

## 2021-12-20 RX ORDER — FENTANYL CITRATE 50 UG/ML
25 INJECTION, SOLUTION INTRAMUSCULAR; INTRAVENOUS
Status: DISCONTINUED | OUTPATIENT
Start: 2021-12-20 | End: 2021-12-20 | Stop reason: HOSPADM

## 2021-12-20 RX ORDER — DEXAMETHASONE SODIUM PHOSPHATE 4 MG/ML
INJECTION, SOLUTION INTRA-ARTICULAR; INTRALESIONAL; INTRAMUSCULAR; INTRAVENOUS; SOFT TISSUE PRN
Status: DISCONTINUED | OUTPATIENT
Start: 2021-12-20 | End: 2021-12-20

## 2021-12-20 RX ORDER — SODIUM CHLORIDE, SODIUM LACTATE, POTASSIUM CHLORIDE, CALCIUM CHLORIDE 600; 310; 30; 20 MG/100ML; MG/100ML; MG/100ML; MG/100ML
INJECTION, SOLUTION INTRAVENOUS CONTINUOUS
Status: DISCONTINUED | OUTPATIENT
Start: 2021-12-20 | End: 2021-12-20 | Stop reason: HOSPADM

## 2021-12-20 RX ORDER — BUPIVACAINE HYDROCHLORIDE AND EPINEPHRINE 2.5; 5 MG/ML; UG/ML
INJECTION, SOLUTION INFILTRATION; PERINEURAL PRN
Status: DISCONTINUED | OUTPATIENT
Start: 2021-12-20 | End: 2021-12-20 | Stop reason: HOSPADM

## 2021-12-20 RX ORDER — OXYCODONE HYDROCHLORIDE 5 MG/1
5 TABLET ORAL EVERY 6 HOURS PRN
Qty: 15 TABLET | Refills: 0 | Status: SHIPPED | OUTPATIENT
Start: 2021-12-20 | End: 2021-12-20

## 2021-12-20 RX ORDER — METHOCARBAMOL 750 MG/1
750 TABLET, FILM COATED ORAL EVERY 6 HOURS PRN
Status: DISCONTINUED | OUTPATIENT
Start: 2021-12-20 | End: 2021-12-20 | Stop reason: HOSPADM

## 2021-12-20 RX ORDER — ONDANSETRON 2 MG/ML
4 INJECTION INTRAMUSCULAR; INTRAVENOUS EVERY 30 MIN PRN
Status: DISCONTINUED | OUTPATIENT
Start: 2021-12-20 | End: 2021-12-20 | Stop reason: HOSPADM

## 2021-12-20 RX ORDER — FENTANYL CITRATE 50 UG/ML
INJECTION, SOLUTION INTRAMUSCULAR; INTRAVENOUS PRN
Status: DISCONTINUED | OUTPATIENT
Start: 2021-12-20 | End: 2021-12-20

## 2021-12-20 RX ORDER — ACETAMINOPHEN 325 MG/1
975 TABLET ORAL EVERY 8 HOURS
Status: DISCONTINUED | OUTPATIENT
Start: 2021-12-20 | End: 2021-12-20 | Stop reason: HOSPADM

## 2021-12-20 RX ORDER — LIDOCAINE HYDROCHLORIDE 20 MG/ML
INJECTION, SOLUTION INFILTRATION; PERINEURAL PRN
Status: DISCONTINUED | OUTPATIENT
Start: 2021-12-20 | End: 2021-12-20

## 2021-12-20 RX ORDER — ONDANSETRON 4 MG/1
4 TABLET, ORALLY DISINTEGRATING ORAL EVERY 30 MIN PRN
Status: DISCONTINUED | OUTPATIENT
Start: 2021-12-20 | End: 2021-12-20 | Stop reason: HOSPADM

## 2021-12-20 RX ORDER — ONDANSETRON 4 MG/1
4 TABLET, ORALLY DISINTEGRATING ORAL EVERY 6 HOURS PRN
Status: DISCONTINUED | OUTPATIENT
Start: 2021-12-20 | End: 2021-12-20 | Stop reason: HOSPADM

## 2021-12-20 RX ORDER — CEFAZOLIN SODIUM 2 G/100ML
2 INJECTION, SOLUTION INTRAVENOUS
Status: COMPLETED | OUTPATIENT
Start: 2021-12-20 | End: 2021-12-20

## 2021-12-20 RX ORDER — PROPOFOL 10 MG/ML
INJECTION, EMULSION INTRAVENOUS PRN
Status: DISCONTINUED | OUTPATIENT
Start: 2021-12-20 | End: 2021-12-20

## 2021-12-20 RX ORDER — FENTANYL CITRATE 50 UG/ML
25 INJECTION, SOLUTION INTRAMUSCULAR; INTRAVENOUS EVERY 5 MIN PRN
Status: DISCONTINUED | OUTPATIENT
Start: 2021-12-20 | End: 2021-12-20 | Stop reason: HOSPADM

## 2021-12-20 RX ORDER — GABAPENTIN 100 MG/1
300 CAPSULE ORAL
Status: COMPLETED | OUTPATIENT
Start: 2021-12-20 | End: 2021-12-20

## 2021-12-20 RX ORDER — SODIUM CHLORIDE, SODIUM LACTATE, POTASSIUM CHLORIDE, CALCIUM CHLORIDE 600; 310; 30; 20 MG/100ML; MG/100ML; MG/100ML; MG/100ML
INJECTION, SOLUTION INTRAVENOUS CONTINUOUS PRN
Status: DISCONTINUED | OUTPATIENT
Start: 2021-12-20 | End: 2021-12-20

## 2021-12-20 RX ORDER — MAGNESIUM OXIDE 420 MG/1
1 TABLET ORAL
Status: ON HOLD | COMMUNITY
Start: 2021-05-10 | End: 2021-12-20

## 2021-12-20 RX ORDER — METHOCARBAMOL 750 MG/1
750 TABLET, FILM COATED ORAL EVERY 6 HOURS PRN
Qty: 30 TABLET | Refills: 0 | Status: SHIPPED | OUTPATIENT
Start: 2021-12-20 | End: 2022-02-11

## 2021-12-20 RX ORDER — HYDROMORPHONE HYDROCHLORIDE 1 MG/ML
0.2 INJECTION, SOLUTION INTRAMUSCULAR; INTRAVENOUS; SUBCUTANEOUS EVERY 5 MIN PRN
Status: DISCONTINUED | OUTPATIENT
Start: 2021-12-20 | End: 2021-12-20 | Stop reason: HOSPADM

## 2021-12-20 RX ADMIN — DEXMEDETOMIDINE 12 MCG: 100 INJECTION, SOLUTION, CONCENTRATE INTRAVENOUS at 08:48

## 2021-12-20 RX ADMIN — SODIUM CHLORIDE, POTASSIUM CHLORIDE, SODIUM LACTATE AND CALCIUM CHLORIDE: 600; 310; 30; 20 INJECTION, SOLUTION INTRAVENOUS at 07:29

## 2021-12-20 RX ADMIN — HYDROMORPHONE HYDROCHLORIDE 0.2 MG: 1 INJECTION, SOLUTION INTRAMUSCULAR; INTRAVENOUS; SUBCUTANEOUS at 09:27

## 2021-12-20 RX ADMIN — ROCURONIUM BROMIDE 10 MG: 50 INJECTION, SOLUTION INTRAVENOUS at 07:57

## 2021-12-20 RX ADMIN — DEXMEDETOMIDINE 8 MCG: 100 INJECTION, SOLUTION, CONCENTRATE INTRAVENOUS at 07:34

## 2021-12-20 RX ADMIN — FENTANYL CITRATE 50 MCG: 50 INJECTION, SOLUTION INTRAMUSCULAR; INTRAVENOUS at 07:29

## 2021-12-20 RX ADMIN — PROPOFOL 50 MG: 10 INJECTION, EMULSION INTRAVENOUS at 08:26

## 2021-12-20 RX ADMIN — PROPOFOL 150 MG: 10 INJECTION, EMULSION INTRAVENOUS at 07:34

## 2021-12-20 RX ADMIN — DEXAMETHASONE SODIUM PHOSPHATE 8 MG: 4 INJECTION, SOLUTION INTRAMUSCULAR; INTRAVENOUS at 07:44

## 2021-12-20 RX ADMIN — HYDROMORPHONE HYDROCHLORIDE 0.2 MG: 1 INJECTION, SOLUTION INTRAMUSCULAR; INTRAVENOUS; SUBCUTANEOUS at 09:16

## 2021-12-20 RX ADMIN — OXYCODONE HYDROCHLORIDE 5 MG: 5 TABLET ORAL at 09:32

## 2021-12-20 RX ADMIN — HYDROMORPHONE HYDROCHLORIDE 0.2 MG: 1 INJECTION, SOLUTION INTRAMUSCULAR; INTRAVENOUS; SUBCUTANEOUS at 09:54

## 2021-12-20 RX ADMIN — ONDANSETRON 4 MG: 2 INJECTION INTRAMUSCULAR; INTRAVENOUS at 08:27

## 2021-12-20 RX ADMIN — FENTANYL CITRATE 50 MCG: 50 INJECTION, SOLUTION INTRAMUSCULAR; INTRAVENOUS at 07:34

## 2021-12-20 RX ADMIN — LIDOCAINE HYDROCHLORIDE 40 MG: 20 INJECTION, SOLUTION INFILTRATION; PERINEURAL at 08:48

## 2021-12-20 RX ADMIN — BENZOCAINE AND MENTHOL 1 LOZENGE: 15; 3.6 LOZENGE ORAL at 08:58

## 2021-12-20 RX ADMIN — GABAPENTIN 300 MG: 300 CAPSULE ORAL at 06:37

## 2021-12-20 RX ADMIN — FENTANYL CITRATE 25 MCG: 50 INJECTION, SOLUTION INTRAMUSCULAR; INTRAVENOUS at 08:58

## 2021-12-20 RX ADMIN — SUGAMMADEX 150 MG: 100 INJECTION, SOLUTION INTRAVENOUS at 08:30

## 2021-12-20 RX ADMIN — FENTANYL CITRATE 25 MCG: 50 INJECTION, SOLUTION INTRAMUSCULAR; INTRAVENOUS at 09:05

## 2021-12-20 RX ADMIN — HYDROMORPHONE HYDROCHLORIDE 0.2 MG: 1 INJECTION, SOLUTION INTRAMUSCULAR; INTRAVENOUS; SUBCUTANEOUS at 09:40

## 2021-12-20 RX ADMIN — ACETAMINOPHEN 975 MG: 325 TABLET, FILM COATED ORAL at 09:32

## 2021-12-20 RX ADMIN — ROCURONIUM BROMIDE 30 MG: 50 INJECTION, SOLUTION INTRAVENOUS at 07:35

## 2021-12-20 RX ADMIN — CEFAZOLIN 2 G: 10 INJECTION, POWDER, FOR SOLUTION INTRAVENOUS at 07:44

## 2021-12-20 RX ADMIN — MIDAZOLAM 2 MG: 1 INJECTION INTRAMUSCULAR; INTRAVENOUS at 07:29

## 2021-12-20 ASSESSMENT — MIFFLIN-ST. JEOR: SCORE: 1471.75

## 2021-12-20 NOTE — OR NURSING
Pt able to ambulate with crutched adequately in Phase II area.  Ok to discharge home without PT seeing patient.  All questions answered.

## 2021-12-20 NOTE — BRIEF OP NOTE
[facility]    Brief Operative Note    Pre-operative diagnosis: Sacroiliitis (H) [M46.1]  Post-operative diagnosis Same as pre-operative diagnosis    Procedure: Procedure(s):  Minimally invasive Right sacroiliac joint fusion  Surgeon: Surgeon(s) and Role:     * Yvan Henry MD - Primary  Anesthesia: General   Estimated Blood Loss: 15mL    Drains: None  Specimens: * No specimens in log *  Findings:   Enterotomy, serosal tear, dural tear, or laceration secondary to the nature of the procedure, that was recognized and repaired and Please see op note.  Complications: None.  Implants:   Implant Name Type Inv. Item Serial No.  Lot No. LRB No. Used Action   IMP SPINAL FX IFUSE SACROILIAC [id number] 7050M-90 - [id number] [employer]ware[employer] IMP SPINAL FX IFUSE SACROILIAC [id number] 7050M-90  [employer] [id number] Right 1 Implanted   IMP SPINAL IFUSE TORQ [id number] STRL LF [id number] - [id number] Metallic Hardware/[place] IMP SPINAL IFUSE TORQ [id number] STRL LF [id number]  [employer] [id number] Right 1 Implanted     Plan:  Activity: Up with assist until independent. No excessive bending or twisting. No lifting >10 lbs x 6 weeks. No Samara lift for transfers.   Weight bearing status: Partial WB RLE x2 weeks - use crutches for ambulation.  Pain management: Multimodal PO regimen .  Diet: Begin with clear fluids and progress diet as tolerated.   DVT prophylaxis: SCDs only. No chemical DVT ppx needed at discharge.  Dressings: Keep dressing c/d/i x 5-7 days.  Follow-up: As scheduled with ESTEBAN Cardona PA-C on 2/1/2021  Disposition: Home with family    Future Appointments   Date Time Provider Department Center   1/11/2022  2:40 PM Iggy Amaya, PT [facility]   2/1/2022  1:30 PM Renetta Cardona PA-C [facility]   3/15/2022  1:30 PM Renetta Cardona PA-C [facility]     Kael Puente MD  Orthopaedic Surgery, PGY-4

## 2021-12-20 NOTE — DISCHARGE INSTRUCTIONS
"Safety Tips for Using Crutches    Crutch Fit:    Assume good standing posture with shoulders relaxed and crutch tips 6-8 inches out from the side of the foot.    The underarm pad should fall 2-3 fingers width below the armpit.    The handgrip is positioned level with the wrist to allow 30  flexion at the elbow.    Safety Tips:    Bear weight on your hands, not on your armpits.    Do not add extra padding to the underarm pad. This will, in effect, lengthen the crutches and increase risk of nerve injury.    Wear flat, properly fitting shoes. Do not walk in stocking feet, high heels or slippers.    Household hazards:  --Throw rugs should be removed from floors.  --Stairs should be cleared of obstacles.  --Use extra caution on slippery, highly polished, littered or uneven floor surfaces.  --Check for electric cords.    Check crutch tips for excessive wear and keep wing nuts tight.    While walking, look forward with  head up  and  eyes open.  Take equal length steps.    Use BOTH crutches.    Stairs Sequence:    UP: \"Good\" leg first, followed by  bad  leg, then crutches.    DOWN: Crutches, followed by  bad  leg, \"good\" leg.     Walking with Crutches:    Move both crutches forward at the same time.    Non-Weight Bearing (NWB):  Hold the involved leg up and swing through the crutches with the involved leg. The involved leg does not touch the floor.    Toe Touch Weight Bearing (TTWB): Move the involved leg forward. Rest it lightly on the floor for balance only. Step through the crutches with the uninvolved leg.    Partial Weight Bearing (PWB): Move the involved leg forward. Step down the weight of the leg only.  Step through the crutches with the uninvolved leg.    Weight Bearing As Tolerated (WBAT): Move the involved leg forward. Put as much pressure through the involved leg as you can tolerate comfortably. Then step through the crutches with the uninvolved leg.      Same-Day Surgery   Adult Discharge Orders & Instructions "     For 24 hours after surgery:  1. Get plenty of rest.  A responsible adult must stay with you for at least 24 hours after you leave the hospital.   2. Pain medication can slow your reflexes. Do not drive or use heavy equipment.  If you have weakness or tingling, don't drive or use heavy equipment until this feeling goes away.  3. Mixing alcohol and pain medication can cause dizziness and slow your breathing. It can even be fatal. Do not drink alcohol while taking pain medication.  4. Avoid strenuous or risky activities.  Ask for help when climbing stairs.   5. You may feel lightheaded.  If so, sit for a few minutes before standing.  Have someone help you get up.   6. If you have nausea (feel sick to your stomach), drink only clear liquids such as apple juice, ginger ale, broth or 7-Up.  Rest may also help.  Be sure to drink enough fluids.  Move to a regular diet as you feel able. Take pain medications with a small amount of solid food, such as toast or crackers, to avoid nausea.   7. A slight fever is normal. Call the doctor if your fever is over 100 F (37.7 C) (taken under the tongue) or lasts longer than 24 hours.  8. You may have a dry mouth, muscle aches, trouble sleeping or a sore throat.  These symptoms should go away after 24 hours.  9. Do not make important or legal decisions.   Pain Management:      1. Take pain medication (if prescribed) for pain as directed by your physician.        2. WARNING: If the pain medication you have been prescribed contains Tylenol  (acetaminophen), DO NOT take additional doses of Tylenol (acetaminophen).     Call your doctor for any of the followin.  Signs of infection (fever, growing tenderness at the surgery site, severe pain, a large amount of drainage or bleeding, foul-smelling drainage, redness, swelling).    2.  It has been over 8 to 10 hours since surgery and you are still not able to urinate (pee).    3.  Headache for over 24 hours.    4.  Numbness, tingling or  weakness the day after surgery (if you had spinal anesthesia).  To contact a doctor, call Dr. Henry, [facility] [phone]  or:      [phone] and ask for the Resident On Call for:         Orthopedics (answered 24 hours a day)      Emergency Department:  [facility]: [phone]  [facility]: [phone]               Rev. 10/2014       Rev. 4/2014      Same-Day Surgery   Adult Discharge Orders & Instructions     For 24 hours after surgery:  10. Get plenty of rest.  A responsible adult must stay with you for at least 24 hours after you leave the hospital.   11. Pain medication can slow your reflexes. Do not drive or use heavy equipment.  If you have weakness or tingling, don't drive or use heavy equipment until this feeling goes away.  12. Mixing alcohol and pain medication can cause dizziness and slow your breathing. It can even be fatal. Do not drink alcohol while taking pain medication.  13. Avoid strenuous or risky activities.  Ask for help when climbing stairs.   14. You may feel lightheaded.  If so, sit for a few minutes before standing.  Have someone help you get up.   15. If you have nausea (feel sick to your stomach), drink only clear liquids such as apple juice, ginger ale, broth or 7-Up.  Rest may also help.  Be sure to drink enough fluids.  Move to a regular diet as you feel able. Take pain medications with a small amount of solid food, such as toast or crackers, to avoid nausea.   16. A slight fever is normal. Call the doctor if your fever is over 100 F (37.7 C) (taken under the tongue) or lasts longer than 24 hours.  17. You may have a dry mouth, muscle aches, trouble sleeping or a sore throat.  These symptoms should go away after 24 hours.  18. Do not make important or legal decisions.   Pain Management:      1. Take pain medication (if prescribed) for pain as directed by your physician.        2. WARNING: If the pain medication you have been  prescribed contains Tylenol  (acetaminophen), DO NOT take additional doses of Tylenol (acetaminophen).     Call your doctor for any of the followin.  Signs of infection (fever, growing tenderness at the surgery site, severe pain, a large amount of drainage or bleeding, foul-smelling drainage, redness, swelling).    2.  It has been over 8 to 10 hours since surgery and you are still not able to urinate (pee).    3.  Headache for over 24 hours.    4.  Numbness, tingling or weakness the day after surgery (if you had spinal anesthesia).  To contact a doctor, call _____________________________________ or:      [phone] and ask for the Resident On Call for:          __________________________________________ (answered 24 hours a day)      Emergency Department:  [facility]: [phone]  [facility]: [phone]               Rev. 10/2014

## 2021-12-20 NOTE — OP NOTE
Procedure Date: 12/20/2021    PREOPERATIVE DIAGNOSIS:  Right sacroiliitis.    POSTOPERATIVE DIAGNOSIS:  Right sacroiliitis.    PROCEDURE PERFORMED:  Minimally invasive right sacroiliac joint fusion.    SURGEON:  Yvan Henry MD    ASSISTANT:  Kael Puente MD    INDICATIONS FOR PROCEDURE:  The patient is a 51-year-old male with buttock pain for a prolonged period of time.  [facility] felt to have sacroiliac joint pain and was referred to me for evaluation.  The physical exam confirmed.  Diagnostic injections relieved his pain.  He was counseled about risks, benefits, alternative treatments and expected outcomes, and it was his desire to proceed.  Of note is on plain film radiographs, it appeared that he did have a dysmorphic sacrum, and he is a small statured individual, so this affects the overall morphology.    DESCRIPTION OF PROCEDURE:  The patient was taken to the OR team was briefed about the plan.  The patient was brought to the operating room and underwent the induction of adequate general anesthesia, was turned and positioned prone on a 4-poster frame.  He was prepared and draped in the usual sterile fashion.  A timeout was done confirming the site and type of surgery.  He did receive prophylactic antibiotics.    Marcaine 0.25% with epinephrine was infiltrated over his left posterior superior iliac spine and a short percutaneous reference pin was placed into the left posterior superior iliac spine.  An intraoperative 3D image was obtained from the O-arm and transferred to the Stealth image-guided workstation.  This was now used to etta the skin incision over the right buttock, which was then infiltrated with Marcaine 0.25% with epinephrine.  The skin incised and then beginning at the upper portion of S1, a navigated K-wire was delivered and checked under 2D imaging.  The soft tissue was dilated, it was drilled, broached, and a 7.0 x 50 mm iFuse 3D implant was seated into place.   I had originally planned on placing a second implant at S1, but there was not adequate space, the dysmorphic sacrum just made this too small; therefore, I made the decision to place an iFuse TORQ 10.0 x 50 mm screw at the S2 segment, a navigated K-wire was delivered, checked under 2D imaging.  Soft tissue dilated, drilled and then a 10.0 x 50 mm iFuse TORQ screw was seated into place; 2D and 3D imaging was obtained, confirming optimal positioning of the implants.  The wound was now closed in layers.  Estimated blood loss for this case was 15 mL.  We utilized a total of 45 mL of Marcaine 0.25% with epinephrine.    Upon completion of the surgery, I called and left a message for his brother, who was not able to answer his cell phone at that time.    It is anticipated that the patient will be discharged to home on crutches for 3 weeks, then go to CPT at 3 weeks for return to normal gait, then to see me back in clinic in 6 weeks.  He will be given #20 Tylenol No. 3 for pain control.  Has been encouraged to use ice and any problems, issues or concerns, he should give my nurse, Delores, a call.    Yvan Henry Jr, MD        D: 2021   T: 2021   MT: [id number]    Name:     ALEXIS DE LA PAZ  MRN:      [id number]        Account:        [id number]   :      1970           Procedure Date: 2021     Document: [id number]    cc:  Copy For Patient

## 2021-12-20 NOTE — ANESTHESIA CARE TRANSFER NOTE
Patient: Jason Solis    Procedure: Procedure(s):  Minimally invasive Right sacroiliac joint fusion       Diagnosis: Sacroiliitis (H) [M46.1]  Diagnosis Additional Information: No value filed.    Anesthesia Type:   General     Note:    Oropharynx: oropharynx clear of all foreign objects  Level of Consciousness: awake  Oxygen Supplementation: face mask  Level of Supplemental Oxygen (L/min / FiO2): 8  Independent Airway: airway patency satisfactory and stable  Dentition: dentition unchanged  Vital Signs Stable: post-procedure vital signs reviewed and stable  Report to RN Given: handoff report given  Patient transferred to: PACU    Handoff Report: Identifed the Patient, Identified the Reponsible Provider, Reviewed the pertinent medical history, Discussed the surgical course, Reviewed Intra-OP anesthesia mangement and issues during anesthesia, Set expectations for post-procedure period and Allowed opportunity for questions and acknowledgement of understanding      Vitals:  Vitals Value Taken Time   /95 12/20/21 0851   Temp 36.5    Pulse 81 12/20/21 0857   Resp 13 12/20/21 0857   SpO2 99 % 12/20/21 0857   Vitals shown include unvalidated device data.    Electronically Signed By: YISSEL Jane CRNA  December 20, 2021  8:58 AM

## 2021-12-20 NOTE — ANESTHESIA POSTPROCEDURE EVALUATION
Patient: Jason Solis    Procedure: Procedure(s):  Minimally invasive Right sacroiliac joint fusion       Diagnosis:Sacroiliitis (H) [M46.1]  Diagnosis Additional Information: No value filed.    Anesthesia Type:  General    Note:  Disposition: Outpatient   Postop Pain Control: Uneventful            Sign Out: Well controlled pain   PONV: No   Neuro/Psych: Uneventful            Sign Out: Acceptable/Baseline neuro status   Airway/Respiratory: Uneventful            Sign Out: Acceptable/Baseline resp. status   CV/Hemodynamics: Uneventful            Sign Out: Acceptable CV status; No obvious hypovolemia; No obvious fluid overload   Other NRE: NONE   DID A NON-ROUTINE EVENT OCCUR? No    Event details/Postop Comments:  Awakening satisfactorily; strong; breathing well; moving all extremities; comfortable; no complaints or complications. Communicating effectively.            Last vitals:  Vitals Value Taken Time   /102 12/20/21 1003   Temp 36.5  C (97.7  F) 12/20/21 0851   Pulse 102 12/20/21 1003   Resp 19 12/20/21 0952   SpO2 99 % 12/20/21 1008   Vitals shown include unvalidated device data.    Electronically Signed By: Demarcus Cerna MD  December 20, 2021  10:17 AM

## 2021-12-21 NOTE — TELEPHONE ENCOUNTER
See phone message of Fri 12-17-21 requesting work letter.   I waited to call pt until today 12-21-21 for assessment because pt had surgery yesterday Mon. 12-20-21 MIS Right SI Joint Fusion.    Pt states doing very well & pain controlled with meds. Sent home including Tylenol & states  muscle relaxant sent home not helping so he switched back to previous Flexeril which is helping.    Wants to work from home starting Mon. 12-27-21 & understands TTWB Right Leg & No lifting.  Requested work note be emailed to him.  Letter completed & emailed.  Call back prn.  Pt agreed.    S.O./Delores Morales RN.

## 2022-01-11 ENCOUNTER — THERAPY VISIT (OUTPATIENT)
Dept: PHYSICAL THERAPY | Facility: CLINIC | Age: 52
End: 2022-01-11
Payer: COMMERCIAL

## 2022-01-11 DIAGNOSIS — Z47.89 AFTERCARE FOLLOWING SURGERY OF THE MUSCULOSKELETAL SYSTEM: ICD-10-CM

## 2022-01-11 PROBLEM — M54.50 ACUTE BILATERAL LOW BACK PAIN WITHOUT SCIATICA: Status: ACTIVE | Noted: 2021-12-17

## 2022-01-11 PROCEDURE — 97110 THERAPEUTIC EXERCISES: CPT | Mod: GP | Performed by: PHYSICAL THERAPIST

## 2022-01-11 PROCEDURE — 97161 PT EVAL LOW COMPLEX 20 MIN: CPT | Mod: GP | Performed by: PHYSICAL THERAPIST

## 2022-01-11 NOTE — PROGRESS NOTES
Physical Therapy Initial Evaluation  Subjective:  The history is provided by the patient. No  was used.   Patient Health History  Jason Solis being seen for s/p R SIJ fusion.     Problem began: 12/20/2021.   Problem occurred: unknown   Pain is reported as 7/10 on pain scale.  General health as reported by patient is good.  Pertinent medical history includes: history of fractures.   Red flags:  None as reported by patient.  Medical allergies: see EPIC.   Surgeries include:  Orthopedic surgery. Other surgery history details: R SIJ and tonsils.    Current medications:  Anti-depressants, muscle relaxants and sleep medication.    Current occupation is LPN.   Primary job tasks include:  Computer work and prolonged sitting.                  Therapist Generated HPI Evaluation  Problem details: Pt. complains of LBP that has been present since R SI joint fusion on 12/20/21.  No known trauma.     .         Type of problem:  Sacroiliac.    This is a new condition.  Occurance: surgery.    Patient reports pain:  SI joint right and mid lumbar spine.  Pain is described as aching and is intermittent.  Pain radiates to:  Gluteals right and thigh right. Pain is worse during the day.  Since onset symptoms are unchanged.  Associated symptoms:  Loss of strength, loss of motion, numbness, loss of motion/stiffness and tingling. Symptoms are exacerbated by bending, lifting, standing and walking  and relieved by rest.    Previous treatment includes physical therapy.   Restrictions due to condition include:  Working in normal job with restrictions.  Barriers include:  None as reported by patient.                        Objective:  System         Lumbar/SI Evaluation  ROM:    AROM Lumbar:   Flexion:          50% with ER tightness  Ext:                    25% with ER tightness   Side Bend:        Left:  75% with ER tightnss    Right:  75% with ER tightnss  Rotation:           Left:     Right:   Side Glide:        Left:      Right:                     Lumbar Palpation:  Palpation (lumbar): incisions healing well but tender.                                                         General     ROS    Assessment/Plan:    Patient is a 51 year old male with lumbar complaints.    Patient has the following significant findings with corresponding treatment plan.                Diagnosis 1:  S/p R SIJ fusion  Pain -  self management, education, directional preference exercise and home program  Decreased ROM/flexibility - manual therapy and therapeutic exercise  Decreased strength - therapeutic exercise and therapeutic activities  Impaired balance - neuro re-education and therapeutic activities  Decreased proprioception - neuro re-education and therapeutic activities  Impaired gait - gait training  Impaired muscle performance - neuro re-education  Decreased function - therapeutic activities    Therapy Evaluation Codes:   1) Clinical presentation characteristics are:   Stable/Uncomplicated.  2) Decision-Making    Low complexity using standardized patient assessment instrument and/or measureable assessment of functional outcome.  Cumulative Therapy Evaluation is: Low complexity.    Previous and current functional limitations:  (See Goal Flow Sheet for this information)    Short term and Long term goals: (See Goal Flow Sheet for this information)     Communication ability:  Patient appears to be able to clearly communicate and understand verbal and written communication and follow directions correctly.  Treatment Explanation - The following has been discussed with the patient:   RX ordered/plan of care  Anticipated outcomes  Possible risks and side effects  This patient would benefit from PT intervention to resume normal activities.   Rehab potential is good.    Frequency:  2 X week, once daily  Duration:  for 6 weeks  Discharge Plan:  Achieve all LTG.  Independent in home treatment program.  Reach maximal therapeutic benefit.    Please refer to  the daily flowsheet for treatment today, total treatment time and time spent performing 1:1 timed codes.

## 2022-01-21 ENCOUNTER — THERAPY VISIT (OUTPATIENT)
Dept: PHYSICAL THERAPY | Facility: CLINIC | Age: 52
End: 2022-01-21
Payer: COMMERCIAL

## 2022-01-21 DIAGNOSIS — M54.50 ACUTE BILATERAL LOW BACK PAIN WITHOUT SCIATICA: ICD-10-CM

## 2022-01-21 DIAGNOSIS — Z47.89 AFTERCARE FOLLOWING SURGERY OF THE MUSCULOSKELETAL SYSTEM: ICD-10-CM

## 2022-01-21 PROCEDURE — 97110 THERAPEUTIC EXERCISES: CPT | Mod: GP | Performed by: PHYSICAL THERAPIST

## 2022-01-21 PROCEDURE — 97112 NEUROMUSCULAR REEDUCATION: CPT | Mod: GP | Performed by: PHYSICAL THERAPIST

## 2022-01-28 ENCOUNTER — THERAPY VISIT (OUTPATIENT)
Dept: PHYSICAL THERAPY | Facility: CLINIC | Age: 52
End: 2022-01-28
Payer: COMMERCIAL

## 2022-01-28 DIAGNOSIS — Z47.89 AFTERCARE FOLLOWING SURGERY OF THE MUSCULOSKELETAL SYSTEM: ICD-10-CM

## 2022-01-28 DIAGNOSIS — M54.50 ACUTE BILATERAL LOW BACK PAIN WITHOUT SCIATICA: ICD-10-CM

## 2022-01-28 PROCEDURE — 97110 THERAPEUTIC EXERCISES: CPT | Mod: GP | Performed by: PHYSICAL THERAPIST

## 2022-01-28 PROCEDURE — 97112 NEUROMUSCULAR REEDUCATION: CPT | Mod: GP | Performed by: PHYSICAL THERAPIST

## 2022-01-31 DIAGNOSIS — Z98.1 S/P SPINAL FUSION: Primary | ICD-10-CM

## 2022-02-03 ENCOUNTER — THERAPY VISIT (OUTPATIENT)
Dept: PHYSICAL THERAPY | Facility: CLINIC | Age: 52
End: 2022-02-03
Payer: COMMERCIAL

## 2022-02-03 DIAGNOSIS — M54.50 ACUTE BILATERAL LOW BACK PAIN WITHOUT SCIATICA: ICD-10-CM

## 2022-02-03 DIAGNOSIS — Z47.89 AFTERCARE FOLLOWING SURGERY OF THE MUSCULOSKELETAL SYSTEM: ICD-10-CM

## 2022-02-03 PROCEDURE — 97110 THERAPEUTIC EXERCISES: CPT | Mod: GP | Performed by: PHYSICAL THERAPIST

## 2022-02-03 PROCEDURE — 97112 NEUROMUSCULAR REEDUCATION: CPT | Mod: GP | Performed by: PHYSICAL THERAPIST

## 2022-02-04 ASSESSMENT — ENCOUNTER SYMPTOMS
TREMORS: 0
FEVER: 0
MUSCLE CRAMPS: 0
MEMORY LOSS: 0
NUMBNESS: 1
SPEECH CHANGE: 0
WEIGHT LOSS: 1
JOINT SWELLING: 0
WEIGHT GAIN: 0
DISTURBANCES IN COORDINATION: 0
DIZZINESS: 0
ALTERED TEMPERATURE REGULATION: 0
INCREASED ENERGY: 0
BACK PAIN: 1
HEADACHES: 1
MYALGIAS: 1
DECREASED APPETITE: 0
WEAKNESS: 0
SEIZURES: 0
CHILLS: 0
NIGHT SWEATS: 0
FATIGUE: 0
POLYPHAGIA: 0
ARTHRALGIAS: 0
LOSS OF CONSCIOUSNESS: 0
TINGLING: 0
STIFFNESS: 0
POLYDIPSIA: 0
PARALYSIS: 0
MUSCLE WEAKNESS: 0
NECK PAIN: 1
HALLUCINATIONS: 0

## 2022-02-11 ENCOUNTER — ANCILLARY PROCEDURE (OUTPATIENT)
Dept: GENERAL RADIOLOGY | Facility: CLINIC | Age: 52
End: 2022-02-11
Attending: PHYSICIAN ASSISTANT
Payer: COMMERCIAL

## 2022-02-11 ENCOUNTER — OFFICE VISIT (OUTPATIENT)
Dept: ORTHOPEDICS | Facility: CLINIC | Age: 52
End: 2022-02-11
Payer: COMMERCIAL

## 2022-02-11 DIAGNOSIS — M46.1 SACROILIITIS (H): Primary | ICD-10-CM

## 2022-02-11 PROCEDURE — 72190 X-RAY EXAM OF PELVIS: CPT | Performed by: RADIOLOGY

## 2022-02-11 PROCEDURE — 99024 POSTOP FOLLOW-UP VISIT: CPT | Performed by: PHYSICIAN ASSISTANT

## 2022-02-11 NOTE — PROGRESS NOTES
Spine Surgery Post-Op Visit    Chief Complaint:   RECHECK (DOS 12-20-21 MIS Right SacroIlaic Joint Fusion )      Interval HPI:   Jason Solis is a 51 year old male who is 6 weeks s/p R SIJ MIS fusion by Dr. Henry 12/20/2021. He has started PT. He still has a lot of buttock pain, hard to tell if symptoms are improved as he is still quite limited and walking with a cane. He c/o numbness in R heel and temp difference in R lower limb compared to L. Not requiring any pain meds.      Oswestry (MALINI) Questionnaire    OSWESTRY DISABILITY INDEX 2/4/2022   Count 9   Sum 26   Oswestry Score (%) 57.78              Medications:     Current Outpatient Medications   Medication     cyclobenzaprine (FLEXERIL) 10 MG tablet     ibuprofen (ADVIL/MOTRIN) 100 MG tablet     multivitamin w/minerals (MULTI-VITAMIN) tablet     sertraline (ZOLOFT) 100 MG tablet     temazepam (RESTORIL) 7.5 MG capsule     acetaminophen (TYLENOL) 325 MG tablet     No current facility-administered medications for this visit.             Physical Exam:     Vitals: There were no vitals taken for this visit.    Constitutional: Patient is healthy, well-nourished and appears stated age.    Respiratory: Patient is breathing normally and in no respiratory distress.    Skin: Incision well healed, no evidence of wound dehiscence or erythema.     Gait: Non-antalgic gait without use of assistive devices.     Musculoskeletal: unable to do single leg stance on right compared to left. Gait antalgia favoring left.          Imaging:   We independently reviewed and interpreted the following imaging at this clinic visit which were also reviewed with patient  Xrays AP/lat/monsivais pelvis 2/11/2022  2 SI joint fusion devices across the right SI joint. The superior device is triangular implants and the caudal device is a torque screw. No evidence of hardware loosening or failure.     Assessment:   51 year old male 6 weeks s/p R MIS SIJ fusion, doing well     Plan:   He can  discontinue the cane. Continue strengthening and physical therapy for the right lower extremity and gluteus muscles. I suspect the right lower limb numbness is may be related to sciatic nerve irritation and will gradually improve with time. Follow-up in 6 weeks with repeat x-rays.     The patient was shown their own imaging and all questions were answered. Plan was reviewed with Dr. Henry.  Thank you for allowing me to be a part of this patient's care.     REECE GuadalupeC   Orthopedic Spine Surgery

## 2022-02-11 NOTE — LETTER
2/11/2022         RE: Jason Solis  [address]  formerly [facility] [address]        Dear Colleague,    Thank you for referring your patient, Jason Solis, to the [facility] [place]. Please see a copy of my visit note below.     Spine Surgery Post-Op Visit    Chief Complaint:   RECHECK (DOS 12-20-21 MIS Right SacroIlaic Joint Fusion )      Interval HPI:   Jason Solis is a 51 year old male who is 6 weeks s/p R SIJ MIS fusion by Dr. Henry 12/20/2021. He has started PT. He still has a lot of buttock pain, hard to tell if symptoms are improved as he is still quite limited and walking with a cane. He c/o numbness in R heel and temp difference in R lower limb compared to L. Not requiring any pain meds.      Oswestry (MALINI) Questionnaire    OSWESTRY DISABILITY INDEX 2/4/2022   Count 9   Sum 26   Oswestry Score (%) 57.78              Medications:     Current Outpatient Medications   Medication     cyclobenzaprine (FLEXERIL) 10 MG tablet     ibuprofen (ADVIL/MOTRIN) 100 MG tablet     multivitamin w/minerals (MULTI-VITAMIN) tablet     sertraline (ZOLOFT) 100 MG tablet     temazepam (RESTORIL) 7.5 MG capsule     acetaminophen (TYLENOL) 325 MG tablet     No current facility-administered medications for this visit.             Physical Exam:     Vitals: There were no vitals taken for this visit.    Constitutional: Patient is healthy, well-nourished and appears stated age.    Respiratory: Patient is breathing normally and in no respiratory distress.    Skin: Incision well healed, no evidence of wound dehiscence or erythema.     Gait: Non-antalgic gait without use of assistive devices.     Musculoskeletal: unable to do single leg stance on right compared to left. Gait antalgia favoring left.          Imaging:   We independently reviewed and interpreted the following imaging at this clinic visit which were also reviewed with patient  Xrays AP/lat/monsivais pelvis 2/11/2022  2 SI joint fusion  devices across the right SI joint. The superior device is triangular implants and the caudal device is a torque screw. No evidence of hardware loosening or failure.     Assessment:   51 year old male 6 weeks s/p R MIS SIJ fusion, doing well     Plan:   He can discontinue the cane. Continue strengthening and physical therapy for the right lower extremity and gluteus muscles. I suspect the right lower limb numbness is may be related to sciatic nerve irritation and will gradually improve with time. Follow-up in 6 weeks with repeat x-rays.     The patient was shown their own imaging and all questions were answered. Plan was reviewed with Dr. Henry.  Thank you for allowing me to be a part of this patient's care.     Renetta Cardona PA-C   Orthopedic Spine Surgery

## 2022-02-18 ENCOUNTER — THERAPY VISIT (OUTPATIENT)
Dept: PHYSICAL THERAPY | Facility: CLINIC | Age: 52
End: 2022-02-18
Payer: COMMERCIAL

## 2022-02-18 DIAGNOSIS — Z47.89 AFTERCARE FOLLOWING SURGERY OF THE MUSCULOSKELETAL SYSTEM: ICD-10-CM

## 2022-02-18 DIAGNOSIS — M54.50 ACUTE BILATERAL LOW BACK PAIN WITHOUT SCIATICA: ICD-10-CM

## 2022-02-18 PROCEDURE — 97112 NEUROMUSCULAR REEDUCATION: CPT | Mod: GP | Performed by: PHYSICAL THERAPIST

## 2022-02-18 PROCEDURE — 97110 THERAPEUTIC EXERCISES: CPT | Mod: GP | Performed by: PHYSICAL THERAPIST

## 2022-03-11 ENCOUNTER — THERAPY VISIT (OUTPATIENT)
Dept: PHYSICAL THERAPY | Facility: CLINIC | Age: 52
End: 2022-03-11
Payer: COMMERCIAL

## 2022-03-11 DIAGNOSIS — Z47.89 AFTERCARE FOLLOWING SURGERY OF THE MUSCULOSKELETAL SYSTEM: ICD-10-CM

## 2022-03-11 DIAGNOSIS — M54.50 ACUTE BILATERAL LOW BACK PAIN WITHOUT SCIATICA: ICD-10-CM

## 2022-03-11 PROCEDURE — 97112 NEUROMUSCULAR REEDUCATION: CPT | Mod: GP | Performed by: PHYSICAL THERAPIST

## 2022-03-11 PROCEDURE — 97110 THERAPEUTIC EXERCISES: CPT | Mod: GP | Performed by: PHYSICAL THERAPIST

## 2022-03-11 NOTE — PROGRESS NOTES
PROGRESS  REPORT    Progress reporting period is from eval to 3/11/22.       SUBJECTIVE  Subjective changes noted by patient:  .  Subjective: C/c is LBP pain.  Functioning at 30-40% of where he wants to be.  LE stronger but back pain persits.    Current pain level is  Current Pain level: 2/10.     Previous pain level was   Initial Pain level: 7/10.   Changes in function:  Yes (See Goal flowsheet attached for changes in current functional level)  Adverse reaction to treatment or activity: None    OBJECTIVE  Changes noted in objective findings:  Yes,   Objective: R hip strength 4/5,  Core strength 3/5     ASSESSMENT/PLAN  Updated problem list and treatment plan: Diagnosis 1:  SI jt fusion  Pain -  self management, education, directional preference exercise and home program  Decreased ROM/flexibility - manual therapy and therapeutic exercise  Decreased strength - therapeutic exercise and therapeutic activities  Impaired muscle performance - neuro re-education  Decreased function - therapeutic activities  STG/LTGs have been met or progress has been made towards goals:  Yes (See Goal flow sheet completed today.)  Assessment of Progress: The patient's condition is improving.  The patient's condition has potential to improve.  Self Management Plans:  Patient has been instructed in a home treatment program.  Patient is independent in a home treatment program.  I have re-evaluated this patient and find that the nature, scope, duration and intensity of the therapy is appropriate for the medical condition of the patient.      Recommendations:  This patient would benefit from further evaluation.    Please refer to the daily flowsheet for treatment today, total treatment time and time spent performing 1:1 timed codes.

## 2022-03-14 DIAGNOSIS — Z98.1 S/P SPINAL FUSION: Primary | ICD-10-CM

## 2022-03-21 ASSESSMENT — ENCOUNTER SYMPTOMS
WEAKNESS: 0
DISTURBANCES IN COORDINATION: 0
HEADACHES: 1
NUMBNESS: 0
SEIZURES: 0
JOINT SWELLING: 0
MUSCLE CRAMPS: 0
STIFFNESS: 1
DIZZINESS: 0
TREMORS: 0
MYALGIAS: 1
TINGLING: 0
SPEECH CHANGE: 0
MUSCLE WEAKNESS: 1
ARTHRALGIAS: 0
LOSS OF CONSCIOUSNESS: 0
BACK PAIN: 1
MEMORY LOSS: 0
NECK PAIN: 1
PARALYSIS: 0

## 2022-03-25 ENCOUNTER — ANCILLARY PROCEDURE (OUTPATIENT)
Dept: GENERAL RADIOLOGY | Facility: CLINIC | Age: 52
End: 2022-03-25
Attending: PHYSICIAN ASSISTANT
Payer: COMMERCIAL

## 2022-03-25 ENCOUNTER — OFFICE VISIT (OUTPATIENT)
Dept: ORTHOPEDICS | Facility: CLINIC | Age: 52
End: 2022-03-25
Payer: COMMERCIAL

## 2022-03-25 DIAGNOSIS — M46.1 SACROILIITIS (H): Primary | ICD-10-CM

## 2022-03-25 PROCEDURE — 99213 OFFICE O/P EST LOW 20 MIN: CPT | Performed by: PHYSICIAN ASSISTANT

## 2022-03-25 PROCEDURE — 72190 X-RAY EXAM OF PELVIS: CPT | Performed by: RADIOLOGY

## 2022-03-25 NOTE — LETTER
3/25/2022         RE: Jason Solis  [address]  [facility] [place] [facility] [address]        Dear Colleague,    Thank you for referring your patient, Jason Solis, to the [facility] [place]. Please see a copy of my visit note below.    Spine Surgery Post-Op Visit    Chief Complaint:   No chief complaint on file.    Interval HPI:   Jason Solis is a 51 year old male who is 3 months s/p R SIJ MIS fusion by Dr. Henry 12/20/2021.  Unfortunately, he still has a lot of back pain.  This is waking him up at night.  He has noticed that the some of the right lower extremity pain has improved compared to pre-op, but his back pain is about the same.  He is doing physical therapy exercises and gradually advancing. RLE weakness is improving.  He is taking Flexeril but if he takes it too much it is ineffective.  He is taking some Tylenol as well.  Preoperatively, he had many lumbar injections including a work-up for RFA's.  None of them were helpful and he was told to cut down on injections because he had about 6 in 7 month period.    Oswestry (MALINI) Questionnaire    OSWESTRY DISABILITY INDEX 3/21/2022   Count 10   Sum 24   Oswestry Score (%) 48             Medications:     Current Outpatient Medications   Medication     cyclobenzaprine (FLEXERIL) 10 MG tablet     ibuprofen (ADVIL/MOTRIN) 100 MG tablet     multivitamin w/minerals (MULTI-VITAMIN) tablet     sertraline (ZOLOFT) 100 MG tablet     temazepam (RESTORIL) 7.5 MG capsule     No current facility-administered medications for this visit.             Physical Exam:     Vitals: There were no vitals taken for this visit.    Constitutional: Patient is healthy, well-nourished and appears stated age.    Respiratory: Patient is breathing normally and in no respiratory distress.    Skin: Incision well healed, no evidence of wound dehiscence or erythema.     Gait: Non-antalgic gait without use of assistive devices.     Musculoskeletal: Strength: Slight  weakness on the right compared to left with single leg stance.5/5 iliopsoas, 5/5 quadriceps, 5/5 hamstrings, 5/5 anterior tibialis, 5/5 extensor hallucis longus, 5/5 gastrocnemius.     Pain to palpation of R piriformis piriformis stretch positive.  Pain also at approximately L4-5 facets.  Quadratus lumborum nonspasmed, nontender.         Imaging:   We independently reviewed and interpreted the following imaging at this clinic visit which were also reviewed with patient  Xrays AP/lat/monsivais pelvis 3/25/2022  Triangular implant in screw in the right SI joint.  No evidence of hardware loosening or failure.  Disc height loss L4-5, L5-S1.  Right disc space collapse at L4-5.    Assessment    51 year old male 3 months s/p MIS R SIJ fusion   Right piriformis syndrome.     Plan:   Referral to Dr. Burton in PM&R for right piriformis treatment and possible injections.  He may also have some ideas for other interventions for his low back pain.  Reviewed his MRI and x-rays which do show some lumbar spondylosis at L4-5.  He can continue Flexeril and add NSAIDs as well.  Follow-up with Dr. Henry in 3 months with repeat x-rays of the pelvis, or sooner if things are not improving    The patient was shown their own imaging and all questions were answered. Plan was reviewed with Dr. Henry.  Thank you for allowing me to be a part of this patient's care.     Renetta Cardona PA-C   Orthopedic Spine Surgery

## 2022-03-25 NOTE — PROGRESS NOTES
Spine Surgery Post-Op Visit    Chief Complaint:   No chief complaint on file.    Interval HPI:   Jason Solis is a 51 year old male who is 3 months s/p R SIJ MIS fusion by Dr. Henry 12/20/2021.  Unfortunately, he still has a lot of back pain.  This is waking him up at night.  He has noticed that the some of the right lower extremity pain has improved compared to pre-op, but his back pain is about the same.  He is doing physical therapy exercises and gradually advancing. RLE weakness is improving.  He is taking Flexeril but if he takes it too much it is ineffective.  He is taking some Tylenol as well.  Preoperatively, he had many lumbar injections including a work-up for RFA's.  None of them were helpful and he was told to cut down on injections because he had about 6 in 7 month period.    Oswestry (MALINI) Questionnaire    OSWESTRY DISABILITY INDEX 3/21/2022   Count 10   Sum 24   Oswestry Score (%) 48             Medications:     Current Outpatient Medications   Medication     cyclobenzaprine (FLEXERIL) 10 MG tablet     ibuprofen (ADVIL/MOTRIN) 100 MG tablet     multivitamin w/minerals (MULTI-VITAMIN) tablet     sertraline (ZOLOFT) 100 MG tablet     temazepam (RESTORIL) 7.5 MG capsule     No current facility-administered medications for this visit.             Physical Exam:     Vitals: There were no vitals taken for this visit.    Constitutional: Patient is healthy, well-nourished and appears stated age.    Respiratory: Patient is breathing normally and in no respiratory distress.    Skin: Incision well healed, no evidence of wound dehiscence or erythema.     Gait: Non-antalgic gait without use of assistive devices.     Musculoskeletal: Strength: Slight weakness on the right compared to left with single leg stance.5/5 iliopsoas, 5/5 quadriceps, 5/5 hamstrings, 5/5 anterior tibialis, 5/5 extensor hallucis longus, 5/5 gastrocnemius.     Pain to palpation of R piriformis piriformis stretch positive.  Pain also at  approximately L4-5 facets.  Quadratus lumborum nonspasmed, nontender.         Imaging:   We independently reviewed and interpreted the following imaging at this clinic visit which were also reviewed with patient  Xrays AP/lat/monsivais pelvis 3/25/2022  Triangular implant in screw in the right SI joint.  No evidence of hardware loosening or failure.  Disc height loss L4-5, L5-S1.  Right disc space collapse at L4-5.    Assessment    51 year old male 3 months s/p MIS R SIJ fusion   Right piriformis syndrome.     Plan:   Referral to Dr. Burton in PM&R for right piriformis treatment and possible injections.  He may also have some ideas for other interventions for his low back pain.  Reviewed his MRI and x-rays which do show some lumbar spondylosis at L4-5.  He can continue Flexeril and add NSAIDs as well.  Follow-up with Dr. Henry in 3 months with repeat x-rays of the pelvis, or sooner if things are not improving    The patient was shown their own imaging and all questions were answered. Plan was reviewed with Dr. Henry.  Thank you for allowing me to be a part of this patient's care.     Renetta Cardona PA-C   Orthopedic Spine Surgery

## 2022-03-28 ENCOUNTER — TELEPHONE (OUTPATIENT)
Dept: OTHER | Age: 52
End: 2022-03-28

## 2022-03-28 NOTE — TELEPHONE ENCOUNTER
Hello,  I'm with ortho con.  Pt has a referral to see Dr. Burton and his next available is in July.  Is there a way to fit this pt in sooner?  Thank you.

## 2022-04-01 ENCOUNTER — THERAPY VISIT (OUTPATIENT)
Dept: PHYSICAL THERAPY | Facility: CLINIC | Age: 52
End: 2022-04-01
Payer: COMMERCIAL

## 2022-04-01 DIAGNOSIS — Z47.89 AFTERCARE FOLLOWING SURGERY OF THE MUSCULOSKELETAL SYSTEM: ICD-10-CM

## 2022-04-01 DIAGNOSIS — M54.50 ACUTE BILATERAL LOW BACK PAIN WITHOUT SCIATICA: ICD-10-CM

## 2022-04-01 PROCEDURE — 97110 THERAPEUTIC EXERCISES: CPT | Mod: GP | Performed by: PHYSICAL THERAPIST

## 2022-04-01 PROCEDURE — 97112 NEUROMUSCULAR REEDUCATION: CPT | Mod: GP | Performed by: PHYSICAL THERAPIST

## 2022-04-01 NOTE — PROGRESS NOTES
PROGRESS  REPORT    Progress reporting period is from evaluation to 4/1/22.       SUBJECTIVE  Subjective changes noted by patient:  .  Subjective: C/c is LBP still.  Planning on seeing PM and R MD next week to address LBP.      Current pain level is  Current Pain level: 2/10.     Previous pain level was   Initial Pain level: 7/10.   Changes in function:  Yes (See Goal flowsheet attached for changes in current functional level)  Adverse reaction to treatment or activity: None    OBJECTIVE  Changes noted in objective findings:  Yes,   Objective: R hip strength 4/5 Clre strength 3/5 still.     ASSESSMENT/PLAN  Updated problem list and treatment plan: Diagnosis 1:  S/p SIJ fusion  Pain -  self management, education, directional preference exercise and home program  Decreased strength - therapeutic exercise and therapeutic activities  Impaired muscle performance - neuro re-education  Decreased function - therapeutic activities  STG/LTGs have been met or progress has been made towards goals:  Yes (See Goal flow sheet completed today.)  Assessment of Progress: The patient's progress has plateaued.  Self Management Plans:  Patient has been instructed in a home treatment program.  Patient is independent in a home treatment program.  Patient  has been instructed in self management of symptoms.  I have re-evaluated this patient and find that the nature, scope, duration and intensity of the therapy is appropriate for the medical condition of the patient.      Recommendations:  This patient is ready to be discharged from therapy and continue their home treatment program.    Please refer to the daily flowsheet for treatment today, total treatment time and time spent performing 1:1 timed codes.

## 2022-04-05 NOTE — PROGRESS NOTES
[facility] Medical Spine Consultation    Date of visit: 4/6/2022    Assessment:  Jason Solis is a 51 year old male with a past medical history significant for PTSD, anxiety, s/p right SI joint fusion who presents with complaints of:    1. Chronic low back/buttock pain: 3 months s/p right SI joint fusion. Reports having right sided low back pain with referral into the right posterior leg prior to surgery. Since surgery having some improvement in leg symptoms. Continues to have right buttock pain some of which is newer and has some radiation into the posterior thigh and calf. Having increased bilateral low back pain. On exam he has pain with palpation of the right piriformis and FADIR is positive on the right. He also has pain with lumbar facet loading bilaterally. Etiology of his pain is likely multifactorial secondary to myofascial pain/piriformis syndrome and lumbar spondylosis with facet mediated pain. He previously had lumbar MBB without benefit. In reviewing notes it seems his pain at that time was lower and located more in region of SI joints. Today low back pain seems to be be over lower lumbar region.      Plan:  The following recommendations were given to the patient. Diagnosis, treatment options, risks, benefits, and alternatives were discussed, and all questions were answered. The patient expressed understanding of the plan for management.     1. Therapies:  Continue PT exercises at home.   2. Self Care Recommendations: Continue ice and/or heat prn  3. Diagnostic Studies: No additional imaging needed. [facility].   4. Medication Management: No changes made today.   5. Procedures recommended:   1. Order placed for right piriformis injection.   2. Consider bilateral L4-5 and L5-S1 facet joint injections vs re-trying lumbar MBB given his pain seems to be more in the lumbar region now than when he originally had the lumbar MBBs done.   6. Follow up: 2-3 weeks after  injection     Reason for consultation:    Primary Care Provider is [facility].    Jason Solis is a 51 year old male who I was asked to see in consultation by Renetta Cardona for evaluation of back pain.    Consultation and Evaluation for: Back pain    Review of Electronic Chart: Today I have also reviewed available medical information in the patient's medical record at [facility] including relevant provider notes, laboratory work, and imaging.     Chief Complaint:    Chief Complaint   Patient presents with     Pain     History:  Jason Solis is a 51 year old male who presents for initial evaluation of chief pain complaint of low back pain. He initially had left sided back pain with radiation into the left leg in the 1990s when serving in the  in [place]. This pain was intermittent and eventually subsided. Over the past several years developed right sided low back pain with radiation into the buttock and posterior leg. He was seen at [facility] in [id number] and had several injections for back and leg pain. None of the injections were all that helpful. He ended up having a right SI joint fusion by Dr. Henry on 12/220/2021 due to ongoing right sided pain. After surgery he has had some improvement in right leg pain and strength. Low back pain has increased. It is constant. He has bilateral low back pain. Worse when laying down, bending, standing. Somewhat relieved with sitting. Pain is sharp in quality. Can get up to 10/10 in severity. He also has right buttock pain which seems to be newer. Having more pain in this area since the fusion. Pain is intermittent. Aggravated with sitting in a chair. It is aching in quality. Can get up to 5/10 in severity. Once he is standing and moving it can improve. This accounts for about half of his pain.     Red Flags: The patient denies bowel or bladder incontinence, parasthesias, weakness, saddle anesthesia, unintentional weight loss,  or fever/chills/sweats.     Medications:       Current pain medications:  Cyclobenzaprine 10 mg prn - ?  Ibuprofen 800 mg q 6 hrs prn - [facility]             Previous pain medications:  Cymbalta  Meloxicam  Trazodone  Toradol     Past Pain Treatments:  PT: Yes - [facility]   TENs Unit:Yes - [facility]  Injections: Yes - [facility], [facility] and [facility]. In review of records he has had the following -   -Bilateral L3,4,5 MBB - [facility] so did not move forward with RFA  - L4-5 ILESI - [facility]  - Right S1 nerve root block - [facility]  - bilateral SI joint injections - pain returned after anesthetic wore off  Self-care:   Yes - ice and heat - [facility]  Surgeries related to pain: Right SI joint fusion - ?  Alternative Therapies:    Chiropractic: yes - [facility]   Acupuncture: yes - [facility]  Other: None    Diagnostic tests:  MRI of Lumbar Spine completed in 2021 at [facility].         EMG/Testing:  EMG of BLE in 2018 shows:      Labs:   Creatinine 1.01    Past Medical History:  Past Medical History:   Diagnosis Date     Alcohol abuse      Anxiety      Bell's palsy      Cervicalgia      Gastroesophageal reflux disease without esophagitis      Insomnia      Irritable bowel syndrome      Sacroiliitis ([facility])      Tinnitus, unspecified laterality      Tobacco dependence syndrome      Ulnar nerve entrapment at elbow        Past Surgical History:  Past Surgical History:   Procedure Laterality Date     COLONOSCOPY       OPTICAL TRACKING SYSTEM FUSION SACRAL ILIAC Right 12/20/2021    Procedure: Minimally invasive Right sacroiliac joint fusion;  Surgeon: Yvan Henry MD;  Location: [facility] OR     TONSILLECTOMY         Medications:  Current Outpatient Medications   Medication Sig Dispense Refill     cyclobenzaprine (FLEXERIL) 10 MG tablet as needed       ibuprofen (ADVIL/MOTRIN) 100 MG tablet Take 800 mg by mouth every 6 hours as needed        multivitamin w/minerals (MULTI-VITAMIN) tablet Take 1 tablet by mouth every morning       sertraline  (ZOLOFT) 100 MG tablet 50 mg every evening        temazepam (RESTORIL) 7.5 MG capsule nightly as needed          Allergies:     Allergies   Allergen Reactions     Duloxetine Visual Disturbance and Anxiety     Other reaction(s): Drowsy, Visual disturbance, Anxiety     Etodolac Rash       Family history:  Family History   Problem Relation Age of Onset     Anesthesia Reaction No family hx of      Cardiovascular No family hx of      Deep Vein Thrombosis (DVT) No family hx of        Social History:  Home situation: Lives in [place], [place]. Mom lives with him.   Occupation/Schooling: works at [employer]  Tobacco use: ocassional   Drug use: none  Alcohol use: 6-10 beers per week    Physical Exam:  Vitals:    04/06/22 1417   BP: (!) 160/104   Pulse: 86   SpO2: 98%     Exam:  Constitutional: Well developed, well nourished, appears stated age.  HEENT: Head atraumatic, normocephalic. Eyes without conjunctival injection or jaundice. Neck supple. No obvious neck masses.  Respiratory: Breathing unlabored on room air  Skin: No rash, lesions of exposed skin.   Psychiatric/mental status: Alert, without lethargy or stupor. Speech fluent. Appropriate affect. Mood normal. Able to follow commands without difficulty.     Musculoskeletal exam:  Gait/Station/Posture:   Slow to get up from seated position  Normal stance, arm swing. Gait is slow.   Normal bulk and tone. Unremarkable spinal curvature. ASIS heights even.     Lumbar spine:  Range of motion within normal limits    Rotation/ext to right: painful    Rotation/ext to left: painful   Myofascial tenderness:  Tenderness in lower lumbar paraspinals   Focal tenderness: Tenderness over right piriformis   SLR: negative   Positive FADIR on right    Neurologic exam:  CN:  Cranial nerves 2-12 are grossly intact  Motor Strength:  5/5 symmetric LE strength    Sensory:  ( lower extremities):   Light touch: normal    Allodynia: absent    Hyperalgesia: absent     Fatuma Donaldson MD  [facility]  [facility]       BILLING TIME DOCUMENTATION:   The total TIME spent on this patient on the date of the encounter/appointment was 40 minutes.      TOTAL TIME includes:   Time spent preparing to see the patient (reviewing records and tests)   Time spent face to face (or over the phone) with the patient   Time spent ordering tests, medications, procedures and referrals  Time spent documenting clinical information in Epic

## 2022-04-06 ENCOUNTER — OFFICE VISIT (OUTPATIENT)
Dept: PALLIATIVE MEDICINE | Facility: CLINIC | Age: 52
End: 2022-04-06
Attending: PHYSICIAN ASSISTANT
Payer: COMMERCIAL

## 2022-04-06 VITALS — OXYGEN SATURATION: 98 % | SYSTOLIC BLOOD PRESSURE: 160 MMHG | HEART RATE: 86 BPM | DIASTOLIC BLOOD PRESSURE: 104 MMHG

## 2022-04-06 DIAGNOSIS — G57.01 PIRIFORMIS SYNDROME, RIGHT: Primary | ICD-10-CM

## 2022-04-06 DIAGNOSIS — M79.18 MYOFASCIAL PAIN: ICD-10-CM

## 2022-04-06 DIAGNOSIS — M47.817 LUMBOSACRAL SPONDYLOSIS WITHOUT MYELOPATHY: ICD-10-CM

## 2022-04-06 PROCEDURE — 99203 OFFICE O/P NEW LOW 30 MIN: CPT | Performed by: PHYSICAL MEDICINE & REHABILITATION

## 2022-04-06 ASSESSMENT — PAIN SCALES - GENERAL: PAINLEVEL: SEVERE PAIN (6)

## 2022-04-06 NOTE — NURSING NOTE
PEG Score 4/6/2022   PEG Total Score 8.33         Massiel Wolf MA  [facility] [place]

## 2022-04-06 NOTE — PATIENT INSTRUCTIONS
1. Order placed for a right piriformis injection. You will be called to schedule.  2. Continue PT.   3. Follow up with me 2-3 weeks after the injection.    Fatuma Donaldson MD  [facility]     ----------------------------------------------------------------  Clinic Number:  [phone]     Call with any questions about your care and for scheduling assistance.     Calls are returned Monday through Friday between 8 AM and 4:30 PM. We usually get back to you within 2 business days depending on the issue/request.    If we are prescribing your medications:    For opioid medication refills, call the clinic or send a Intent HQ message 7 days in advance.  Please include:    Name of requested medication    Name of the pharmacy.    For non-opioid medications, call your pharmacy directly to request a refill. Please allow 3-4 days to be processed.     Per [place] State Law:    All controlled substance prescriptions must be filled within 30 days of being written.      For those controlled substances allowing refills, pickup must occur within 30 days of last fill.      We believe regular attendance is key to your success in our program!      Any time you are unable to keep your appointment we ask that you call us at least 24 hours in advance to cancel.This will allow us to offer the appointment time to another patient.     Multiple missed appointments may lead to dismissal from the clinic.

## 2022-04-11 NOTE — PROGRESS NOTES
[facility] - Procedure Note    Date of Service: 4/12/2022    Procedure performed: Right piriformis injection  Diagnosis: Myofascial trigger point/piriformis syndrome  : Fatuma Donaldson MD    Indications: Jason Solis is a 52 year old male who is seen by me in clinic presents today for a piriformis injection. The patient describes right sided.low back/buttock pain with some referral into the posterior thigh and calf. The patient reports minimal improvement with conservative treatment, including PT, medications. He had a right SI joint fusion in December 2021 with ongoing pain.     XR Pelvis on 3/25/2022 shows:                                                                   Impression:  1. Postoperative changes of right sacroiliac joint fusion without  evidence of hardware complication.  2. Degenerative changes of the hips and lumbar spine similar to prior.    Allergies:      Allergies   Allergen Reactions     Duloxetine Visual Disturbance and Anxiety     Other reaction(s): Drowsy, Visual disturbance, Anxiety     Etodolac Rash        Vitals:  BP (!) 148/104   Pulse 80   SpO2 98%     Review of Systems: The patient denies recent fever, chills, illness, use of antibiotics or anticoagulants. All other 10-point review of systems negative.     Procedure:   A fluoroscopic view of the inferior border of the right SI joint was obtained.  A location 1.5 cm inferior and 1.5 cm lateral to the inferior border of the SI joint was marked. 2 ml of Lidocaine 1% was used to anesthetize the skin. The needle was advance under intermittent fluoroscopy through the gluteal muscle to the deeper piriformis muscle. At this point, 0.5 mL of Omnipaque was injected and 9.5 mL wasted, with a flow consistent with piriformis muscle spread.  A solution containing 4 ml of 0.25% bupivacaine and 40 mg of kenalog was injected.  The needle was removed.     Assessment/Plan: Jason Solis is a 52 year old male  s/p right piriformis injection today for myositis/piriformis syndrome.     Pre procecedure pain score: 6/10 in back and 5/10 in buttock/leg   Post procedure pain score: 6/10 in back and 5/10 in buttock/leg     1. The patient was advised to contact the [facility] for any of the following:   Fever, chills, or night sweats   New onset of pain, numbness, or weakness   Any questions/concerns regarding the procedure  If unable to contact the Pain Center, the patient was instructed to go to a local Emergency Room for any complications.   2. The patient will follow up with me in 2-3 weeks.      Fatuma Donaldson MD  [facility] [place]

## 2022-04-12 ENCOUNTER — RADIOLOGY INJECTION OFFICE VISIT (OUTPATIENT)
Dept: PALLIATIVE MEDICINE | Facility: CLINIC | Age: 52
End: 2022-04-12
Attending: PHYSICAL MEDICINE & REHABILITATION
Payer: COMMERCIAL

## 2022-04-12 VITALS — HEART RATE: 80 BPM | DIASTOLIC BLOOD PRESSURE: 104 MMHG | SYSTOLIC BLOOD PRESSURE: 148 MMHG | OXYGEN SATURATION: 98 %

## 2022-04-12 DIAGNOSIS — M79.18 MYOFASCIAL PAIN: Primary | ICD-10-CM

## 2022-04-12 DIAGNOSIS — G57.01 PIRIFORMIS SYNDROME, RIGHT: ICD-10-CM

## 2022-04-12 PROCEDURE — 77002 NEEDLE LOCALIZATION BY XRAY: CPT | Performed by: PHYSICAL MEDICINE & REHABILITATION

## 2022-04-12 PROCEDURE — 20552 NJX 1/MLT TRIGGER POINT 1/2: CPT | Performed by: PHYSICAL MEDICINE & REHABILITATION

## 2022-04-12 RX ORDER — TRIAMCINOLONE ACETONIDE 40 MG/ML
40 INJECTION, SUSPENSION INTRA-ARTICULAR; INTRAMUSCULAR ONCE
Status: COMPLETED | OUTPATIENT
Start: 2022-04-12 | End: 2022-04-12

## 2022-04-12 RX ADMIN — TRIAMCINOLONE ACETONIDE 40 MG: 40 INJECTION, SUSPENSION INTRA-ARTICULAR; INTRAMUSCULAR at 09:51

## 2022-04-12 NOTE — NURSING NOTE
Pre-procedure Intake    If YES to any questions or NO to having a   Please complete laminated checklist and leave on the computer keyboard for Provider, verbally inform provider if able.    For SCS Trial, RFA's or any sedation procedure: NA    If yes, for how long?         Are you taking any any blood thinners such as Coumadin, Warfarin, Jantoven, Pradaxa Xarelto, Eliquis, Edoxaban, Enoxaparin, Lovenox, Heparin, Arixtra, Fondaparinux, or Fragmin? OR Antiplatelet medication such as Plavix, Brilinta, or Effient?  NO     If yes, when did you take your last dose?        Do you take aspirin?   NO    If cervical procedure, have you held aspirin for 6 days?   NA    Do you have any allergies to contrast dye, iodine, steroid and/or numbing medications?  NO     Are you currently taking antibiotics or have an active infection?  NO     Have you had a fever/elevated temperature within the past week? NO     Are you currently taking oral steroids? NO     Do you have a ? Yes     Are you pregnant or breastfeeding? NA     Have you received the COVID-19 vaccine? Yes     If yes, was it your 1st, 2nd or only dose needed? Booster dose 1/18/22    Notify provider and RNs if systolic BP >170, diastolic BP >100, P >100 or O2 sats < 90%

## 2022-04-12 NOTE — NURSING NOTE
Discharge Information    IV Discontiued Time:  NA    Amount of Fluid Infused:  NA    Discharge Criteria = When patient returns to baseline or as per MD order    Consciousness:  Pt is fully awake    Circulation:  BP +/- 20% of pre-procedure level    Respiration:  Patient is able to breathe deeply    O2 Sat:  Patient is able to maintain O2 Sat >92% on room air    Activity:  Moves 4 extremities on command    Ambulation:  Patient is able to stand and walk or stand and pivot into wheelchair    Dressing:  Clean/dry or No Dressing    Notes:   Discharge instructions and AVS given to patient    Patient meets criteria for discharge?  YES    Admitted to PCU?  No    Responsible adult present to accompany patient home?  Yes    Signature/Title:    Aleida Del Rosario RN  RN Care Coordinator  [facility] [place]

## 2022-04-12 NOTE — PATIENT INSTRUCTIONS
[facility] Procedure Discharge Instructions    Today you saw:   Dr. Fatuma Donaldson      Your procedure:  Piriformis injection     Medications used:  Lidocaine (anesthetic)  Bupivacaine (anesthetic)   Kenalog (steroid)  Omnipaque (contrast)           Be cautious when walking as numbness and/or weakness in the legs may occur up to 6-8 hours after the procedure due to effect of the local anesthetic  Do not drive for 6 hours. The effect of the local anesthetic could slow your reflexes.   Avoid strenuous activity for the first 24 hours. You may resume your regular activities after that.   You may shower, however avoid swimming, tub baths or hot tubs for 24 hours following your procedure  You may have a mild to moderate increase in pain for several days following the injection.    You may use ice packs for 10-15 minutes, 3 to 4 times a day at the injection site for comfort  Do not use heat to painful areas for 6 to 8 hours. This will give the local anesthetic time to wear off and prevent you from accidentally burning your skin.  Unless you have been directed to avoid the use of anti-inflammatory medications (NSAIDS-ibuprofen, Aleve, Motrin), you may use these medications or Tylenol for pain control if needed.   With diabetes, check your blood sugar more frequently than usual as your blood sugar may be higher than normal for 10-14 days following a steroid injection. Contact your doctor who manages your diabetes if your blood sugar is higher than usual  Possible side effects of steroids that you may experience include flushing, elevated blood pressure, increased appetite, mild headaches and restlessness.  All of these symptoms will get better with time.  It may take up to 14 days for the steroid medication to start working although you may feel the effect as early as a few days after the procedure.   Follow up with your referring provider in 2-3 weeks    If you experience any of the following, call the pain  center line during work hours at [phone] or on-call physician after hours at [phone]:  -Fever over 100 degree F  -Swelling, bleeding, redness, drainage, warmth at the injection site  -Progressive weakness or numbness in your legs   -Loss of bowel or bladder function  -Unusual new onset of pain that is not improving

## 2022-04-26 ENCOUNTER — OFFICE VISIT (OUTPATIENT)
Dept: PALLIATIVE MEDICINE | Facility: CLINIC | Age: 52
End: 2022-04-26
Payer: COMMERCIAL

## 2022-04-26 VITALS — OXYGEN SATURATION: 98 % | SYSTOLIC BLOOD PRESSURE: 177 MMHG | DIASTOLIC BLOOD PRESSURE: 113 MMHG | HEART RATE: 80 BPM

## 2022-04-26 DIAGNOSIS — M47.817 LUMBOSACRAL SPONDYLOSIS WITHOUT MYELOPATHY: Primary | ICD-10-CM

## 2022-04-26 PROCEDURE — 99213 OFFICE O/P EST LOW 20 MIN: CPT | Performed by: PHYSICAL MEDICINE & REHABILITATION

## 2022-04-26 ASSESSMENT — PAIN SCALES - GENERAL: PAINLEVEL: SEVERE PAIN (7)

## 2022-04-26 NOTE — NURSING NOTE
PEG Score 4/6/2022 4/26/2022   PEG Total Score 8.33 7         Massiel Wolf MA  [facility] [place]

## 2022-04-26 NOTE — PATIENT INSTRUCTIONS
Order placed for lumbar medial branch block. You will be called to schedule.     Fatuma Donaldson MD  [facility]     ----------------------------------------------------------------  Clinic Number:  [phone]   Call with any questions about your care and for scheduling assistance.   Calls are returned Monday through Friday between 8 AM and 4:30 PM. We usually get back to you within 2 business days depending on the issue/request.    If we are prescribing your medications:  For opioid medication refills, call the clinic or send a Davia message 7 days in advance.  Please include:  Name of requested medication  Name of the pharmacy.  For non-opioid medications, call your pharmacy directly to request a refill. Please allow 3-4 days to be processed.   Per [place] State Law:  All controlled substance prescriptions must be filled within 30 days of being written.    For those controlled substances allowing refills, pickup must occur within 30 days of last fill.      We believe regular attendance is key to your success in our program!    Any time you are unable to keep your appointment we ask that you call us at least 24 hours in advance to cancel.This will allow us to offer the appointment time to another patient.   Multiple missed appointments may lead to dismissal from the clinic.

## 2022-04-26 NOTE — PROGRESS NOTES
[facility]    Date of visit: 4/26/2022    Assessment:  Jason Solis is a 51 year old male with a past medical history significant for PTSD, anxiety, s/p right SI joint fusion who presents with complaints of:     1. Chronic low back/buttock pain: 3-4 months s/p right SI joint fusion. Reports having right sided low back pain with referral into the right posterior leg prior to surgery. Since surgery having some improvement in leg symptoms. Continues to have right buttock pain some of which is newer and has some radiation into the posterior thigh and calf. Having increased bilateral low back pain. On initial exam he had pain with palpation of the right piriformis and FADIR was positive on the right. He also had pain with lumbar facet loading bilaterally. Etiology of his pain is likely multifactorial secondary to myofascial pain/piriformis syndrome and lumbar spondylosis with facet mediated pain. He had about 10% improvement in pain with recent piriformis injection. He previously had lumbar MBB without benefit. In reviewing notes it seems his pain at that time was lower and located more in region of SI joints. Today low back pain seems to be be over lower lumbar region.         Plan:  The following recommendations were given to the patient. Diagnosis, treatment options, risks, benefits, and alternatives were discussed, and all questions were answered. The patient expressed understanding of the plan for management.     1. Therapies:  Continue PT exercises at home.   2. Self Care Recommendations: Continue ice and/or heat prn  3. Diagnostic Studies: No additional imaging needed. [facility].   4. Medication Management: No changes made today.   5. Procedures recommended:   1. Order placed for bilateral lumbar MBB given his pain seems to be more in the lumbar region now than when he originally had the lumbar MBBs done.   6. Follow up: for injections    Since  his last visit, Jason TERESSA Solis reports:  - about 10% improvement in pain from recent right piriformis injection.   - pain at this time is located more in the lower lumbar region.   - no other changes reported today.     Denies red flags including: bowel or bladder symptoms, fever, chills, saddle anesthesia, profound motor loss, history of cancer, history of immune compromise, weight loss.     Current medication treatments include:  Cyclobenzaprine 10 mg prn - ?  Ibuprofen 800 mg q 6 hrs prn - [facility]    Previous medication treatments included:  Cymbalta  Meloxicam  Trazodone  Toradol     Other treatments have included:  PT: Yes - [facility]   TENs Unit:Yes - [facility]  Injections: Yes - [facility], [facility] and [facility]. In review of records he has had the following -   - right piriformis injection - 10% benefit  - Bilateral L3,4,5 MBB - [facility] so did not move forward with RFA  - L4-5 ILESI - [facility]  - Right S1 nerve root block - [facility]  - bilateral SI joint injections - pain returned after anesthetic wore off  Self-care:   Yes - ice and heat - [facility]  Surgeries related to pain: Right SI joint fusion - ?  Alternative Therapies:               Chiropractic: yes - [facility]              Acupuncture: yes - [facility]  Other: None    Past Medical History:  Past Medical History:   Diagnosis Date     Alcohol abuse      Anxiety      Bell's palsy      Cervicalgia      Gastroesophageal reflux disease without esophagitis      Insomnia      Irritable bowel syndrome      Sacroiliitis (H)      Tinnitus, unspecified laterality      Tobacco dependence syndrome      Ulnar nerve entrapment at elbow      Past Surgical History:  Past Surgical History:   Procedure Laterality Date     COLONOSCOPY       OPTICAL TRACKING SYSTEM FUSION SACRAL ILIAC Right 12/20/2021    Procedure: Minimally invasive Right sacroiliac joint fusion;  Surgeon: Yvan Henry MD;  Location: [facility] OR     TONSILLECTOMY       Medications:  Current Outpatient Medications    Medication Sig Dispense Refill     cyclobenzaprine (FLEXERIL) 10 MG tablet as needed       ibuprofen (ADVIL/MOTRIN) 100 MG tablet Take 800 mg by mouth every 6 hours as needed        multivitamin w/minerals (MULTI-VITAMIN) tablet Take 1 tablet by mouth every morning       sertraline (ZOLOFT) 100 MG tablet 50 mg every evening        temazepam (RESTORIL) 7.5 MG capsule nightly as needed        Allergies:     Allergies   Allergen Reactions     Duloxetine Visual Disturbance and Anxiety     Other reaction(s): Drowsy, Visual disturbance, Anxiety     Etodolac Rash     Diagnostic Tests:  Lumbar MRI completed in 2021 at [facility] showed:         Labs:  Creatinine 1.01    EMG/Testing:  EMG of BLE in 2018 shows:       Physical Exam:  Vitals:    04/26/22 1403   BP: (!) 177/113   Pulse: 80   SpO2: 98%     Exam:  Constitutional: Well developed, well nourished, appears stated age.  HEENT: Head atraumatic, normocephalic. Eyes without conjunctival injection or jaundice. Neck supple. No obvious neck masses.  Respiratory: Breathing unlabored on room air.    Skin: No rash, lesions of exposed skin.   Psychiatric/mental status: Alert, without lethargy or stupor. Speech fluent. Appropriate affect. Mood normal. Able to follow commands without difficulty.     Musculoskeletal exam:  Gait/Station/Posture:   Normal stance, arm swing, and stride  Normal bulk and tone. Unremarkable spinal curvature.      Lumbar spine:  Range of motion within normal limits    Rotation/ext to right: painful    Rotation/ext to left: painful   Myofascial tenderness:  Bilateral lower lumbar paraspinals    Fatuma Donaldson MD  [facility]      BILLING TIME DOCUMENTATION:   The total TIME spent on this patient on the date of the encounter/appointment was 22 minutes.      TOTAL TIME includes:   Time spent preparing to see the patient (reviewing records and tests)   Time spent face to face (or over the phone) with the patient   Time spent  ordering tests, medications, procedures and referrals   Time spent documenting clinical information in Epic

## 2022-05-10 ENCOUNTER — RADIOLOGY INJECTION OFFICE VISIT (OUTPATIENT)
Dept: PALLIATIVE MEDICINE | Facility: CLINIC | Age: 52
End: 2022-05-10
Attending: PHYSICAL MEDICINE & REHABILITATION
Payer: COMMERCIAL

## 2022-05-10 VITALS — OXYGEN SATURATION: 100 % | HEART RATE: 70 BPM | DIASTOLIC BLOOD PRESSURE: 95 MMHG | SYSTOLIC BLOOD PRESSURE: 155 MMHG

## 2022-05-10 DIAGNOSIS — M47.816 FACET ARTHROPATHY, LUMBAR: ICD-10-CM

## 2022-05-10 DIAGNOSIS — M47.817 LUMBOSACRAL SPONDYLOSIS WITHOUT MYELOPATHY: Primary | ICD-10-CM

## 2022-05-10 PROCEDURE — 64494 INJ PARAVERT F JNT L/S 2 LEV: CPT | Mod: 50 | Performed by: PHYSICAL MEDICINE & REHABILITATION

## 2022-05-10 PROCEDURE — 64493 INJ PARAVERT F JNT L/S 1 LEV: CPT | Mod: 50 | Performed by: PHYSICAL MEDICINE & REHABILITATION

## 2022-05-10 NOTE — PROGRESS NOTES
[facility] - Procedure Note    Date of Service: 5/10/2022    Procedure performed: Bilateral L3,4,5 medial branch blocks #1  Diagnosis: Lumbar spondylosis; Lumbar facet arthropathy  : Fatuma Donaldson MD   Anesthesia: None  Complications: None    Indications: Jason Solis is a 52 year old male who is seen by me in clinic presents today for  for lumbar medial branch blocks. The patient describes pain with extension/rotation. The patient reports minimal improvement with conservative treatment, including previous injections, PT, medications.     MRI completed in 2021 at [facility] showed:         Allergies:      Allergies   Allergen Reactions     Duloxetine Visual Disturbance and Anxiety     Other reaction(s): Drowsy, Visual disturbance, Anxiety     Etodolac Rash        Vitals:  BP (!) 155/95   Pulse 70   SpO2 100%     Review of Systems: The patient denies recent fever, chills, illness, use of antibiotics or anticoagulants. All other 10-point review of systems negative.     Procedure:   Options/alternatives, benefits and risks were discussed with the patient including bleeding, infection, tissue trauma, exposure to radiation, reaction to medications, spinal cord injury, weakness, numbness and paralysis.  Questions were answered to his satisfaction and he agrees to proceed. Voluntary informed consent was obtained and signed.     After getting informed consent, patient was brought into the procedure suite and was placed in a prone position on the procedure table.   A Pause for the Cause was performed.  Patient was prepped and draped in sterile fashion.     Under AP fluoroscopic guidance the L4, L5 vertebral bodies were identified. The C-arm was rotated to the oblique view to afford optimal visualization of the pedicles.  Lidocaine 1% was used to anesthetize the skin at each level.  Under intermittent fluoroscopy, 22 gauge 3.5 inch Quinke spinal needles were positioned inferior  and lateral to the intersection of the transverse process and pedicle at the Bilateral L4 & L5 levels, as well as the corresponding sacral alar notches. The needle positions were verified and optimized from the AP and lateral views.    The anatomic targets for the L3 & L4 medial nerve and L5 dorsal ramus (which functionally incorporates the medial branch) were the  L4 & L5 transverse processes and sacral alar notch, with laterality as described above, resulting in blockade of the L4/5 and L5/S1 facet joints.    After negative aspiration, 1 cc's of Omnipaque 300 was injected to rule out intravascular injection.  9 cc's of omnipaque 300 was wasted.  Bupivacaine 0.25% 0.5 ml was injected at each location. The needles were removed. Hemostasis was achieved, the area was cleaned, and bandaids were placed when appropriate. The patient tolerated the procedure well, and was taken to the recovery room. Images were saved to PACS.      Pre procecedure pain score: 6/10   Post procedure pain score: 7/10.     Assessment/Plan: Jason Solis is a 52 year old male s/p bilateral L3,4,5 medial branch block #1 today for lumbar spondylosis, facet arthropathy.       1. The patient will continue to monitor progress, and they were given a pain diary to complete at home.  They will either fax or mail this back to us or bring it to their next appointment. We will determine the treatment plan after we review the diary.    2. The patient was advised to contact the [facility] for any of the following:   Fever, chills, or night sweats   New onset of pain, numbness, or weakness   Any questions/concerns regarding the procedure  If unable to contact the Pain Center, the patient was instructed to go to a local Emergency Room for any complications.   3. We will await the pain diary to determine the next step of the treatment plan and call the patient to schedule.        MD ESTEBAN [facility]

## 2022-05-10 NOTE — PATIENT INSTRUCTIONS
[facility]   Medial Branch Block Discharge Instructions      Your procedure was performed by:  Dr. Fatuma Donaldson    Medications used: bupivacaine     You will need to complete the Pain Scale Log form and return it to us as soon as possible.  Once we have received the form, we will review it and call you to determine the next steps.     The form can be faxed to [phone] or mailed to:   [facility] [place] [facility]    [address], [address] [id number]    You may resume your regular activity after the injection.  Be cautious with walking since you may have numbness and/or weakness in the lower extremities for up to 6-8 hours after the procedure due to the effects of the local anesthetic.  Avoid driving for 6 hours. The local anesthetic could slow your reflexes  You may shower, however no swimming or tub baths or hot tubs for 24 hours following your procedure.  Your pain will return after the numbing medications have worn off.  You may use your current pain medications as needed.  Unless you have been directed to avoid the use of anti-inflammatory medications (NSAIDS), you may use medications such as ibuprofen, Aleve or Tylenol for pain control if needed. Some people find it helpful to alternate ibuprofen and Tylenol every 3 hours for a couple of days.  You may use ice packs 10-15 minutes three to four times a day at the injection site for comfort.   Do not use heat to painful areas for 6 to 8 hours. This will give the local anesthetic time to wear off and prevent you from accidentally burning your skin.   If you experience any of the following, call the Pain Clinic during work hours (Monday through Friday 8 am-4:30 pm) at [phone] or the Provider Line after hours at [phone]:  -Fever over 100 degree F  -Swelling, bleeding, redness, drainage, warmth at the injection site  -Progressive weakness or numbness in your legs or  arms  -If lumbar, call if you have a loss of bowel or bladder function  -Unusual new onset of pain that is not improving

## 2022-05-10 NOTE — NURSING NOTE
Discharge Information    IV Discontiued Time:  NA    Amount of Fluid Infused:  NA    Discharge Criteria = When patient returns to baseline or as per MD order    Consciousness:  Pt is fully awake    Circulation:  BP +/- 20% of pre-procedure level    Respiration:  Patient is able to breathe deeply    O2 Sat:  Patient is able to maintain O2 Sat >92% on room air    Activity:  Moves 4 extremities on command    Ambulation:  Patient is able to stand and walk or stand and pivot into wheelchair    Dressing:  Clean/dry or No Dressing    Notes:   Discharge instructions and AVS given to patient    Patient meets criteria for discharge?  YES    Admitted to PCU?  No    Responsible adult present to accompany patient home?  Yes    Signature/Title:    Aleida Del Rosario RN  RN Care Coordinator  [facility] [place]    
Pre-procedure Intake  If YES to any questions or NO to having a   Please complete laminated checklist and leave on the computer keyboard for Provider, verbally inform provider if able.    For SCS Trial, RFA's or any sedation procedure:  Have you been fasting? NA    If yes, for how long?     Are you taking any any blood thinners such as Coumadin, Warfarin, Jantoven, Pradaxa Xarelto, Eliquis, Edoxaban, Enoxaparin, Lovenox, Heparin, Arixtra, Fondaparinux, or Fragmin? OR Antiplatelet medication such as Plavix, Brilinta, or Effient?   No     If yes, when did you take your last dose?     Do you take aspirin?  No    If cervical procedure, have you held aspirin for 6 days?   NA    Do you have any allergies to contrast dye, iodine, steroid and/or numbing medications?  NO    Are you currently taking antibiotics or have an active infection?  NO    Have you had a fever/elevated temperature within the past week? NO    Are you currently taking oral steroids? NO    Do you have a ? Yes    Are you pregnant or breastfeeding?  Not Applicable    Have you received the COVID-19 vaccine? Yes    If yes, was it your 1st, 2nd or only dose needed? 2nd    Date of most recent vaccine: 01/18/22    Vitals: B/P 144/98    Notify provider and RNs if systolic BP >170, diastolic BP >100, P >100 or O2 sats < 90%      Massiel Wolf MA  [facility] [place]      
[facility]

## 2022-06-16 DIAGNOSIS — Z98.1 S/P SPINAL FUSION: Primary | ICD-10-CM

## 2022-06-16 NOTE — PROGRESS NOTES
Discharge Note    Progress reporting period is from initial eval to Apr 1, 2022.     Jason failed to return for next follow up visit and current status is unknown.  Please see information below for last relevant information on current status.  Patient seen for Rxs Used: 7 visits.  SUBJECTIVE  Subjective changes noted by patient:  Subjective: C/c is LBP still.  Planning on seeing PM and R MD next week to address LBP.    .  Current pain level is Current Pain level: 2/10.     Previous pain level was  Initial Pain level: 7/10.   Changes in function:  Yes (See Goal flowsheet attached for changes in current functional level)  Adverse reaction to treatment or activity: None    OBJECTIVE  Changes noted in objective findings: Objective: R hip strength 4/5 Clre strength 3/5 still.     ASSESSMENT/PLAN  Diagnosis: s/p R SIJ fusion   DIAGP:  Diagnoses of Aftercare following surgery of the musculoskeletal system and Acute bilateral low back pain without sciatica were pertinent to this visit.  Updated problem list and treatment plan:   Decreased strength - HEP  STG/LTGs have been met or progress has been made towards goals:  Yes, please see goal flowsheet for most current information  Assessment of Progress: current status is unknown.  Last current status:     Self Management Plans:  HEP  I have re-evaluated this patient and find that the nature, scope, duration and intensity of the therapy is appropriate for the medical condition of the patient.  Jason continues to require the following intervention to meet STG and LTG's:  HEP.    Recommendations:  Discharge with current home program.  Patient to follow up with MD as needed.    Please refer to the daily flowsheet for treatment today, total treatment time and time spent performing 1:1 timed codes.

## 2022-06-22 ASSESSMENT — ENCOUNTER SYMPTOMS
ARTHRALGIAS: 1
JOINT SWELLING: 0
MEMORY LOSS: 0
HEADACHES: 1
WEAKNESS: 1
SPEECH CHANGE: 0
TINGLING: 1
MUSCLE CRAMPS: 1
PARALYSIS: 0
DIZZINESS: 0
LOSS OF CONSCIOUSNESS: 0
MYALGIAS: 1
TREMORS: 0
NUMBNESS: 1
BACK PAIN: 1
STIFFNESS: 1
SEIZURES: 0
MUSCLE WEAKNESS: 1
DISTURBANCES IN COORDINATION: 0
NECK PAIN: 1

## 2022-06-29 ENCOUNTER — OFFICE VISIT (OUTPATIENT)
Dept: ORTHOPEDICS | Facility: CLINIC | Age: 52
End: 2022-06-29
Payer: COMMERCIAL

## 2022-06-29 ENCOUNTER — ANCILLARY PROCEDURE (OUTPATIENT)
Dept: GENERAL RADIOLOGY | Facility: CLINIC | Age: 52
End: 2022-06-29
Attending: ORTHOPAEDIC SURGERY
Payer: COMMERCIAL

## 2022-06-29 VITALS — HEIGHT: 65 IN | BODY MASS INDEX: 24.83 KG/M2 | WEIGHT: 149 LBS

## 2022-06-29 DIAGNOSIS — G57.01 PIRIFORMIS SYNDROME OF RIGHT SIDE: ICD-10-CM

## 2022-06-29 DIAGNOSIS — M46.1 SACROILIITIS (H): ICD-10-CM

## 2022-06-29 DIAGNOSIS — M25.559 HIP PAIN: Primary | ICD-10-CM

## 2022-06-29 DIAGNOSIS — M62.830 MUSCLE SPASM OF BACK: ICD-10-CM

## 2022-06-29 PROCEDURE — 99212 OFFICE O/P EST SF 10 MIN: CPT | Performed by: ORTHOPAEDIC SURGERY

## 2022-06-29 PROCEDURE — 72190 X-RAY EXAM OF PELVIS: CPT | Performed by: RADIOLOGY

## 2022-06-29 ASSESSMENT — ENCOUNTER SYMPTOMS
BACK PAIN: 1
STIFFNESS: 1
BLOATING: 0
HEARTBURN: 0
DIZZINESS: 0
ARTHRALGIAS: 1
BLOOD IN STOOL: 1
NECK PAIN: 1
JAUNDICE: 0
MEMORY LOSS: 0
TREMORS: 1
MUSCLE CRAMPS: 1
MUSCLE WEAKNESS: 1
SEIZURES: 0
HEADACHES: 1
CONSTIPATION: 0
MYALGIAS: 1
LOSS OF CONSCIOUSNESS: 0
SPEECH CHANGE: 0
NUMBNESS: 1
TINGLING: 1
NAUSEA: 0
ABDOMINAL PAIN: 0
RECTAL PAIN: 0
DIARRHEA: 1
WEAKNESS: 1
JOINT SWELLING: 0
DISTURBANCES IN COORDINATION: 0
VOMITING: 0
BOWEL INCONTINENCE: 0
PARALYSIS: 0

## 2022-06-29 ASSESSMENT — HOOS JR
WALKING ON UNEVEN SURFACE: SEVERE
BENDING TO THE FLOOR TO PICK UP OBJECT: MODERATE
LYING IN BED (TURNING OVER, MAINTAINING HIP POSITION): MODERATE
RISING FROM SITTING: SEVERE
SITTING: MODERATE
GOING UP OR DOWN STAIRS: SEVERE
HOOS JR TOTAL INTERVAL SCORE: 43.34

## 2022-06-29 NOTE — TELEPHONE ENCOUNTER
DIAGNOSIS: Right hip CAM impingement Dr. Henry refer   APPOINTMENT DATE: 06/30/2022   NOTES STATUS DETAILS   OFFICE NOTE from referring provider Internal 06/29/2022 to 10/14/2021 - Yvan Henry MD - [facility]     OFFICE NOTE from other specialist EPIC 04/26/2022 - Fatuma Donaldson MD - [facility]    02/11/2022 - Renetta Cardona PA-C - [facility]    07/29/2021 - Enrique Mckenzie CNP - [facility]      OPERATIVE REPORT Internal 12/20/2021 - Minimally invasive Right sacroiliac joint fusion   MEDICATION LIST Internal    IMPLANT RECORD/STICKER Internal    LABS     CBC/DIFF Internal Most Recent: 11/10/2021   MRI PACS 06/10/2021 - L Spine   INJECTIONS DONE IN RADIOLOGY PACS 05/10/2022, 12/20/2021   XRAYS (IMAGES & REPORTS) PACS 06/16/2022, 03/14/2022, 02/11/2022, 10/14/2021 - Pelvis   12/20/2021 - Surgery [place]

## 2022-06-29 NOTE — LETTER
6/29/2022     RE: Jason Solis  [address]  [place] [address]    Dear Colleague,    Thank you for referring your patient, Jason Solis, to the [facility] [place]. Please see a copy of my visit note below.    FOLLOWUP EVALUATION     HISTORY OF PRESENT ILLNESS:  Jason is now 6 months out from a minimally invasive right SI fusion.  He was better and then started having problems more recently.  His current visual analog pain scale is a 7.  Oswestry score is a 57.  He has been seen by Dr. Donaldson from [facility] who did facet blocks which made his symptoms worse and presents today for followup.    PHYSICAL EXAMINATION:    GENERAL:  Shows a thin, well-developed male in no acute distress.    MUSCULOSKELETAL:  Evaluation of stance shows slight loss of lumbar lordosis, otherwise good coronal and sagittal alignment.  He is tender to palpation over his quadratus lumborum, right greater than left.  Right greater trochanter, posterior aspect; left greater trochanter, minimal tenderness.  Posterior superior iliac spine tender to palpation.  Seated piriformis stretch test is positive on the right.  Internal rotation of his hips significantly reproduces his pain on the right.      IMAGING STUDIES:  Imaging reviewed today includes AP, lateral and Castanon of the pelvis that shows the implants in the right SI joint well fixed with no evidence of loosening.  There is a cam impingement lesion on his right femoral head/neck junction and there is some cystic change in the lateral acetabulum.    ASSESSMENT:    1.  SI joint fusion, stable implants, ongoing pain.  2.  Quadratus lumborum and piriformis spasm.  3.  Right hip cam impingement lesion.    PLAN:  I would like to have him see Physical Therapy, specifically for stretching of the quadratus lumborum and the piriformis and then I would like to have him see Dr. Lucio Collins for evaluation of his right hip.    Yvan Henry,  MD

## 2022-06-29 NOTE — PROGRESS NOTES
FOLLOWUP EVALUATION     HISTORY OF PRESENT ILLNESS:  Jason is now 6 months out from a minimally invasive right SI fusion.  He was better and then started having problems more recently.  His current visual analog pain scale is a 7.  Oswestry score is a 57.  He has been seen by Dr. Donaldson from Pain Clinic who did facet blocks which made his symptoms worse and presents today for followup.    PHYSICAL EXAMINATION:    GENERAL:  Shows a thin, well-developed male in no acute distress.    MUSCULOSKELETAL:  Evaluation of stance shows slight loss of lumbar lordosis, otherwise good coronal and sagittal alignment.  He is tender to palpation over his quadratus lumborum, right greater than left.  Right greater trochanter, posterior aspect; left greater trochanter, minimal tenderness.  Posterior superior iliac spine tender to palpation.  Seated piriformis stretch test is positive on the right.  Internal rotation of his hips significantly reproduces his pain on the right.      IMAGING STUDIES:  Imaging reviewed today includes AP, lateral and Castanon of the pelvis that shows the implants in the right SI joint well fixed with no evidence of loosening.  There is a cam impingement lesion on his right femoral head/neck junction and there is some cystic change in the lateral acetabulum.    ASSESSMENT:    1.  SI joint fusion, stable implants, ongoing pain.  2.  Quadratus lumborum and piriformis spasm.  3.  Right hip cam impingement lesion.    PLAN:  I would like to have him see Physical Therapy, specifically for stretching of the quadratus lumborum and the piriformis and then I would like to have him see Dr. Lucio Collins for evaluation of his right hip.

## 2022-06-29 NOTE — NURSING NOTE
"Reason For Visit:   Chief Complaint   Patient presents with     RECHECK     DOS 12-20-21 MIS Right SacroIlaic Joint Fusion        Primary MD: [place], [facility]  Ref. MD: EST    ?  No  Occupation [employer]  Currently working? Yes.  Work status?  Full time.     Date of injury: 30 yrs ago  Type of injury:  related.     Date of surgery: 12/20/21  Type of surgery: Minimally invasive Right sacroiliac joint fusion     Smoker: Yes  Request smoking cessation information: No    Ht 1.66 m (5' 5.35\")   Wt 67.6 kg (149 lb)   BMI 24.53 kg/m      Pain Assessment  Patient Currently in Pain: Yes  0-10 Pain Scale: 7  Primary Pain Location: Back    Oswestry (MALINI) Questionnaire    OSWESTRY DISABILITY INDEX 6/22/2022   Count 10   Sum 17   Oswestry Score (%) 34        Visual Analog Pain Scale  Back Pain Scale 0-10: 7  Right leg pain: 7  Left leg pain: 5  Neck Pain Scale 0-10: 0  Right arm pain: 0  Left arm pain: 0    Promis 10 Assessment    PROMIS 10 6/22/2022   In general, would you say your health is: Good   In general, would you say your quality of life is: Good   In general, how would you rate your physical health? Good   In general, how would you rate your mental health, including your mood and your ability to think? Good   In general, how would you rate your satisfaction with your social activities and relationships? Good   In general, please rate how well you carry out your usual social activities and roles Good   To what extent are you able to carry out your everyday physical activities such as walking, climbing stairs, carrying groceries, or moving a chair? Moderately   How often have you been bothered by emotional problems such as feeling anxious, depressed or irritable? Sometimes   How would you rate your fatigue on average? None   How would you rate your pain on average?   0 = No Pain  to  10 = Worst Imaginable Pain 7   In general, would you say your health is: 3   In general, would you say " your quality of life is: 3   In general, how would you rate your physical health? 3   In general, how would you rate your mental health, including your mood and your ability to think? 3   In general, how would you rate your satisfaction with your social activities and relationships? 3   In general, please rate how well you carry out your usual social activities and roles. (This includes activities at home, at work and in your community, and responsibilities as a parent, child, spouse, employee, friend, etc.) 3   To what extent are you able to carry out your everyday physical activities such as walking, climbing stairs, carrying groceries, or moving a chair? 3   In the past 7 days, how often have you been bothered by emotional problems such as feeling anxious, depressed, or irritable? 3   In the past 7 days, how would you rate your fatigue on average? 1   In the past 7 days, how would you rate your pain on average, where 0 means no pain, and 10 means worst imaginable pain? 7   Global Mental Health Score 12   Global Physical Health Score 13   PROMIS TOTAL - SUBSCORES 25   Some recent data might be hidden                Aleida Recinos LPN

## 2022-06-30 ENCOUNTER — PRE VISIT (OUTPATIENT)
Dept: ORTHOPEDICS | Facility: CLINIC | Age: 52
End: 2022-06-30

## 2022-06-30 ENCOUNTER — ANCILLARY PROCEDURE (OUTPATIENT)
Dept: GENERAL RADIOLOGY | Facility: CLINIC | Age: 52
End: 2022-06-30
Attending: ORTHOPAEDIC SURGERY

## 2022-06-30 ENCOUNTER — OFFICE VISIT (OUTPATIENT)
Dept: ORTHOPEDICS | Facility: CLINIC | Age: 52
End: 2022-06-30
Payer: COMMERCIAL

## 2022-06-30 DIAGNOSIS — M25.559 HIP PAIN: ICD-10-CM

## 2022-06-30 DIAGNOSIS — M25.559 HIP PAIN: Primary | ICD-10-CM

## 2022-06-30 PROCEDURE — 99212 OFFICE O/P EST SF 10 MIN: CPT | Performed by: ORTHOPAEDIC SURGERY

## 2022-06-30 PROCEDURE — 73501 X-RAY EXAM HIP UNI 1 VIEW: CPT | Mod: RT | Performed by: RADIOLOGY

## 2022-06-30 NOTE — LETTER
6/30/2022         RE: Jason Solis  [address]  [place] [address]        Dear Colleague,    Thank you for referring your patient, Jason Solis, to the [facility] [place]. Please see a copy of my visit note below.    Assessment: This is a 52 year old with multiple pain sites including quad lumborum/low back, PSIS, greater trochanter and with a elevated alpha angle on radiographs of unclear significance but at east today not associated with groin pain or groin pain on examination of the hip. Discussed that should he develop groin pain that the next most appropriate step is to [facility] for evaluation.   Plan:  PRN        Chief Complaint: Consult (Right hip pain. Had SI fusion with Elaine, no relief. DOS 12/20/21. Feels locking in the joint. )      Physician:  No ref. provider found    HPI: Jason Solis is a 52 year old male who presents today for evaluation of    Symptom Profile  Location of symptoms:  Level of lateral lumbar spine, lateral hip, PSIS  Onset: insidious  Trend: getting not better or worse   Duration of symptoms:  Quality of symptoms: aching, sharp/stabbing  Severity:moderate to at times more severe  Alleviate:activity modification   Exacerbating: activities   Previous Treatments: Previous treatments include activity modification, oral pain medication,     NOAH Jr: 43.34    MEDICAL HISTORY:   Past Medical History:   Diagnosis Date     Alcohol abuse      Anxiety      Bell's palsy      Cervicalgia      Gastroesophageal reflux disease without esophagitis      Insomnia      Irritable bowel syndrome      Sacroiliitis (H)      Tinnitus, unspecified laterality      Tobacco dependence syndrome      Ulnar nerve entrapment at elbow        Medications:     Current Outpatient Medications:      cyclobenzaprine (FLEXERIL) 10 MG tablet, as needed, Disp: , Rfl:      ibuprofen (ADVIL/MOTRIN) 100 MG tablet, Take 800 mg by mouth every 6 hours as needed , Disp: , Rfl:       multivitamin w/minerals (THERA-VIT-M) tablet, Take 1 tablet by mouth every morning, Disp: , Rfl:      sertraline (ZOLOFT) 100 MG tablet, 50 mg every evening , Disp: , Rfl:      temazepam (RESTORIL) 7.5 MG capsule, nightly as needed , Disp: , Rfl:     Allergies: Duloxetine and Etodolac    SURGICAL HISTORY:   Past Surgical History:   Procedure Laterality Date     COLONOSCOPY       OPTICAL TRACKING SYSTEM FUSION SACRAL ILIAC Right 12/20/2021    Procedure: Minimally invasive Right sacroiliac joint fusion;  Surgeon: Yvan Henry MD;  Location: [facility]     TONSILLECTOMY         FAMILY HISTORY:   Family History   Problem Relation Age of Onset     Anesthesia Reaction No family hx of      Cardiovascular No family hx of      Deep Vein Thrombosis (DVT) No family hx of        SOCIAL HISTORY:   Social History     Tobacco Use     Smoking status: Current Some Day Smoker     Years: 10.00     Types: Cigars, Cigarettes     Smokeless tobacco: Never Used     Tobacco comment: occasional cigar (11/10/21); smoked cigarettes ~ 10 yrs   Substance Use Topics     Alcohol use: Yes     Comment: occasional beer       REVIEW OF SYSTEMS:  The comprehensive review of systems from the intake form was reviewed with the patient.  No fever, weight change or fatigue. No dry eyes. No oral ulcers, sore throat or voice change. No palpitations, syncope, angina or edema.  No chest pain, excessive sleepiness, shortness of breath or hemoptysis.   No abdominal pain, nausea, vomiting, diarrhea or heartburn.  No skin rash. No focal weakness or numbness. No bleeding or lymphadenopathy. No rhinitis or hives.     Exam:  On physical examination the patient appears the stated age, is in no acute distress, affectThe is appropriate, and breathing is non-labored.  Vitals are documented in the EMR and have been reviewed:    There were no vitals taken for this visit.  Data Unavailable  There is no height or weight on file to calculate BMI.    RIGHT and let hips  symmetric  hip subjective: not irritated. Uncomfortable supine with legs straight, better with knees flexed  Abd: 25  Add:10  PFC:  Flexion: 95  IRF:0  ERF:25  Impingement test: - with symptoms laterally, same symptoms reproduced with palpation over the trochanters more on the right than the left.    LEFT hip subjective:  Abd:  Add:  PFC:  Flexion:  IRF:  ERF:  Impingement test:    Distally, the circulatory, motor, and sensation exam is intact with 5/5 EHL, gastroc-soleus, and tibialis anterior.  Sensation to light touch is intact.  Dorsalis pedis and posterior tibialis pulses are palpable.  There are no sores on the feet, no bruising, and no lymphedema.    X-rays:   Study Result    Narrative & Impression   1 views right hip radiographs 6/30/2022 2:50 PM     History: Hip pain     Comparison: 6/29/2022     Findings:     Crosstable lateral views of the right hip were obtained.      No acute osseous abnormality on the single projection study. Hip joint  is grossly congruent on this single image.     Partial visualization right sacroiliac joint fusion device.                                                                      IMPRESSION: Hip joint is grossly congruent on this single image.     [employer]ano[employer]         SYSTEM ID:  [id number]           Answers for HPI/ROS submitted by the patient on 6/29/2022  General Symptoms: No  Skin Symptoms: No  HENT Symptoms: No  EYE SYMPTOMS: No  HEART SYMPTOMS: No  LUNG SYMPTOMS: No  INTESTINAL SYMPTOMS: Yes  URINARY SYMPTOMS: No  REPRODUCTIVE SYMPTOMS: No  SKELETAL SYMPTOMS: Yes  BLOOD SYMPTOMS: No  NERVOUS SYSTEM SYMPTOMS: Yes  MENTAL HEALTH SYMPTOMS: No  Heart burn or indigestion: No  Nausea: No  Vomiting: No  Abdominal pain: No  Bloating: No  Constipation: No  Diarrhea: Yes  Blood in stool: Yes  Black stools: No  Rectal or Anal pain: No  Fecal incontinence: No  Yellowing of skin or eyes: No  Vomit with blood: No  Change in stools: No  Back pain: Yes  Muscle aches: Yes  Neck  pain: Yes  Swollen joints: No  Joint pain: Yes  Bone pain: No  Muscle cramps: Yes  Muscle weakness: Yes  Joint stiffness: Yes  Bone fracture: No  Trouble with coordination: No  Dizziness or trouble with balance: No  Fainting or black-out spells: No  Memory loss: No  Headache: Yes  Seizures: No  Speech problems: No  Tingling: Yes  Tremor: Yes  Weakness: Yes  Difficulty walking: Yes  Paralysis: No  Numbness: Yes        Lucio Collins MD

## 2022-06-30 NOTE — PROGRESS NOTES
Assessment: This is a 52 year old with multiple pain sites including quad lumborum/low back, PSIS, greater trochanter and with a elevated alpha angle on radiographs of unclear significance but at east today not associated with groin pain or groin pain on examination of the hip. Discussed that should he develop groin pain that the next most appropriate step is to RTC for evaluation.   Plan:  PRN        Chief Complaint: Consult (Right hip pain. Had SI fusion with Elaine, no relief. DOS 12/20/21. Feels locking in the joint. )      Physician:  No ref. provider found    HPI: Jason Solis is a 52 year old male who presents today for evaluation of    Symptom Profile  Location of symptoms:  Level of lateral lumbar spine, lateral hip, PSIS  Onset: insidious  Trend: getting not better or worse   Duration of symptoms:  Quality of symptoms: aching, sharp/stabbing  Severity:moderate to at times more severe  Alleviate:activity modification   Exacerbating: activities   Previous Treatments: Previous treatments include activity modification, oral pain medication,     NOAH Jr: 43.34    MEDICAL HISTORY:   Past Medical History:   Diagnosis Date     Alcohol abuse      Anxiety      Bell's palsy      Cervicalgia      Gastroesophageal reflux disease without esophagitis      Insomnia      Irritable bowel syndrome      Sacroiliitis (H)      Tinnitus, unspecified laterality      Tobacco dependence syndrome      Ulnar nerve entrapment at elbow        Medications:     Current Outpatient Medications:      cyclobenzaprine (FLEXERIL) 10 MG tablet, as needed, Disp: , Rfl:      ibuprofen (ADVIL/MOTRIN) 100 MG tablet, Take 800 mg by mouth every 6 hours as needed , Disp: , Rfl:      multivitamin w/minerals (THERA-VIT-M) tablet, Take 1 tablet by mouth every morning, Disp: , Rfl:      sertraline (ZOLOFT) 100 MG tablet, 50 mg every evening , Disp: , Rfl:      temazepam (RESTORIL) 7.5 MG capsule, nightly as needed , Disp: , Rfl:     Allergies:  Duloxetine and Etodolac    SURGICAL HISTORY:   Past Surgical History:   Procedure Laterality Date     COLONOSCOPY       OPTICAL TRACKING SYSTEM FUSION SACRAL ILIAC Right 12/20/2021    Procedure: Minimally invasive Right sacroiliac joint fusion;  Surgeon: Yvan Henry MD;  Location: [facility] OR     TONSILLECTOMY         FAMILY HISTORY:   Family History   Problem Relation Age of Onset     Anesthesia Reaction No family hx of      Cardiovascular No family hx of      Deep Vein Thrombosis (DVT) No family hx of        SOCIAL HISTORY:   Social History     Tobacco Use     Smoking status: Current Some Day Smoker     Years: 10.00     Types: Cigars, Cigarettes     Smokeless tobacco: Never Used     Tobacco comment: occasional cigar (11/10/21); smoked cigarettes ~ 10 yrs   Substance Use Topics     Alcohol use: Yes     Comment: occasional beer       REVIEW OF SYSTEMS:  The comprehensive review of systems from the intake form was reviewed with the patient.  No fever, weight change or fatigue. No dry eyes. No oral ulcers, sore throat or voice change. No palpitations, syncope, angina or edema.  No chest pain, excessive sleepiness, shortness of breath or hemoptysis.   No abdominal pain, nausea, vomiting, diarrhea or heartburn.  No skin rash. No focal weakness or numbness. No bleeding or lymphadenopathy. No rhinitis or hives.     Exam:  On physical examination the patient appears the stated age, is in no acute distress, affectThe is appropriate, and breathing is non-labored.  Vitals are documented in the EMR and have been reviewed:    There were no vitals taken for this visit.  Data Unavailable  There is no height or weight on file to calculate BMI.    RIGHT and let hips symmetric  hip subjective: not irritated. Uncomfortable supine with legs straight, better with knees flexed  Abd: 25  Add:10  PFC:  Flexion: 95  IRF:0  ERF:25  Impingement test: - with symptoms laterally, same symptoms reproduced with palpation over the trochanters  more on the right than the left.    LEFT hip subjective:  Abd:  Add:  PFC:  Flexion:  IRF:  ERF:  Impingement test:    Distally, the circulatory, motor, and sensation exam is intact with 5/5 EHL, gastroc-soleus, and tibialis anterior.  Sensation to light touch is intact.  Dorsalis pedis and posterior tibialis pulses are palpable.  There are no sores on the feet, no bruising, and no lymphedema.    X-rays:   Study Result    Narrative & Impression   1 views right hip radiographs 6/30/2022 2:50 PM     History: Hip pain     Comparison: 6/29/2022     Findings:     Crosstable lateral views of the right hip were obtained.      No acute osseous abnormality on the single projection study. Hip joint  is grossly congruent on this single image.     Partial visualization right sacroiliac joint fusion device.                                                                      IMPRESSION: Hip joint is grossly congruent on this single image.     [facility]         SYSTEM ID:  [id number]           Answers for HPI/ROS submitted by the patient on 6/29/2022  General Symptoms: No  Skin Symptoms: No  HENT Symptoms: No  EYE SYMPTOMS: No  HEART SYMPTOMS: No  LUNG SYMPTOMS: No  INTESTINAL SYMPTOMS: Yes  URINARY SYMPTOMS: No  REPRODUCTIVE SYMPTOMS: No  SKELETAL SYMPTOMS: Yes  BLOOD SYMPTOMS: No  NERVOUS SYSTEM SYMPTOMS: Yes  MENTAL HEALTH SYMPTOMS: No  Heart burn or indigestion: No  Nausea: No  Vomiting: No  Abdominal pain: No  Bloating: No  Constipation: No  Diarrhea: Yes  Blood in stool: Yes  Black stools: No  Rectal or Anal pain: No  Fecal incontinence: No  Yellowing of skin or eyes: No  Vomit with blood: No  Change in stools: No  Back pain: Yes  Muscle aches: Yes  Neck pain: Yes  Swollen joints: No  Joint pain: Yes  Bone pain: No  Muscle cramps: Yes  Muscle weakness: Yes  Joint stiffness: Yes  Bone fracture: No  Trouble with coordination: No  Dizziness or trouble with balance: No  Fainting or black-out spells: No  Memory loss:  No  Headache: Yes  Seizures: No  Speech problems: No  Tingling: Yes  Tremor: Yes  Weakness: Yes  Difficulty walking: Yes  Paralysis: No  Numbness: Yes

## 2022-06-30 NOTE — NURSING NOTE
Reason For Visit:   Chief Complaint   Patient presents with     Consult     Right hip pain. Had SI fusion with Elaine, no relief. DOS 12/20/21. Feels locking in the joint.        There were no vitals taken for this visit.         Genesis Vides, ATC

## 2022-10-03 ENCOUNTER — HEALTH MAINTENANCE LETTER (OUTPATIENT)
Age: 52
End: 2022-10-03

## 2022-12-29 NOTE — PROGRESS NOTES
Has a right-sided weakness from the previous stroke he has had  He feels that he has gained 70% of his strength back after his stroke  He has no difficulty swallowing  He uses a cane to ambulate  He does not drive due to his eyes been affected by diabetes  He uses public transportation    Spine Surgery Consultation  REFERRING PHYSICIAN: No ref. provider found   PRIMARY CARE PHYSICIAN: No primary care provider on file.           Chief Complaint:   Consult (AMPARO sacroillitis/Dr. Leavitt)    History of Present Illness:  Symptom Profile Including: location of symptoms, onset, severity, exacerbating/alleviating factors, previous treatments:        Jason Solis is a 51 year old male who retired marine working as a LPN at the [employer] with longstanding right-sided low back pain.  The patient reports that he initially started to have left-sided low back pain with pain radiating down his leg in the 1990s when he was serving in [place] in the .  This was intermittent eventually subsided.  He now has had approximately 1 to 2 years of sustained right low back pain with some radiation down his right leg.  He has been evaluated by pain medication physical therapy many times over the last year at the [facility], with [facility]    He was eventually seen by Dr. Daryl Leavitt; who felt his examination was most consistent with sacroiliitis and referred him here to be seen by Dr. Henry for consideration of SI joint fusion.  The patient does note that he had a few injections into the SI joint with very good short-term relief but no sustained relief from these injections.    He at this point is not able to participate in the activities he enjoys doing.  He has a tough time even sitting on his right buttock because of the sensitivity and pain.  He does have some pain radiating into his right shin area.  He has never had surgery on his low back or right SI joint.  He is does not smoke.  He is interesting in attempting to improve his quality of life as it is complete very limited by this problem right now current.         Past Medical History:   No past medical history on file.         Past Surgical History:   No past surgical history on file.         Social History:  "    Social History     Tobacco Use     Smoking status: Not on file   Substance Use Topics     Alcohol use: Not on file            Family History:   No family history on file.         Allergies:     Allergies   Allergen Reactions     Duloxetine Visual Disturbance and Anxiety     Other reaction(s): Drowsy, Visual disturbance, Anxiety     Etodolac Rash          Medications:     Current Outpatient Medications   Medication     cyclobenzaprine (FLEXERIL) 10 MG tablet     sertraline (ZOLOFT) 100 MG tablet     temazepam (RESTORIL) 7.5 MG capsule     No current facility-administered medications for this visit.             Review of Systems:     A 10 point ROS was performed and reviewed. Specific responses to these questions are noted at the end of the document.         Physical Exam:   Vitals: Ht 1.655 m (5' 5.16\")   Wt 68 kg (150 lb)   BMI 24.84 kg/m    Constitutional: awake, alert, cooperative, no apparent distress, appears stated age.    Eyes: The sclera are white.  Ears, Nose, Throat: The trachea is midline.  Psychiatric: The patient has a normal affect.  Respiratory: breathing non-labored  Cardiovascular: The extremities are warm and perfused.  Skin: no obvious rashes or lesions.  Musculoskeletal, Neurologic, and Spine:     SI joint exam:       Right   Left     Madiha Finger Test    positive   negative     LIVIER    Positive   Negative     Thigh Thrust:   Positive   Negative     Pelvic Compression Test    Positive   Negative     Palpation    Positive   Negative     Pelvic Gapping    Positive   Negative     Gaenslen s Test    Positive    Negative     Sacral Thrust (SI)   Positive   Negative     MALINI is 57.78         Imaging:   We ordered and independently reviewed new radiographs at this clinic visit. The results were discussed with the patient.  Findings include:  Inlet outlet and lateral of the sacrum x-rays obtained today were reviewed and show some sacral dysmorphism with the upsloping sacral portion articulating " with the ileum at the SI joint.          MRI of the lumbar spine does show some very early degenerative changes but no severe stenosis or severe spondylolisthesis.           Assessment and Plan:   Assessment:  51 year old male with right sacroiliitis     This patient meets the JUAN JOSÉ and ISASS criteria for minimally invasive SI fusion. These include 3/5 physical exam manuevers being positive, a positive response to injection, failure of extensive non-surgical management, imaging that rules out other possible pain generators (MRI of lumbar spine showing the sacrum, and plain films showing preserved joint space). This patient does not have a generalized pain disorder or generalized pain behavior. Diagnostic imaging, including CT scan and XR, exclude other conditions including tumor, infection, inflammatory arthropathy, and other conditions that could cause low back or buttock pain (hip pathology or spine pathology). CT scan and XR pelvis demonstrate degeneration of right sacroiliac joint which supports diagnosis of right/left sacroiliitis.     Sacroiliac joint pain has been present and has caused significant impairment of patient's daily activities for more then 12 months. Patient has tried and failed conservative management. They have had at least 2 image-guided, contrast-enhanced intra-articular sacroiliac joint anesthetic/ therapeutic injections into the right sacroiliac joint which provided 80-90% relief of patient's pain. Patient has tried medical management, activity modification, and active physical therapy programs targeted at lumbar spine, pelvis, and Sacroiliac joint without relief.  Due to all of these reasons, and failed conservative management, we recommend minimally invasive right sacroiliac joint fusion to alleviate patient's sacroiliitis.     We discussed minimally invasive sacroiliac joint fusion in detail.  It is performed using titanium triangular rods.  About 70-80% of patients who have an SI fusion  experience roughly 50% improvement in symptoms.  About 30% of Dr. Henry's patients have gone on to need the contralateral side fused as well.  We discussed the procedure in detail, and we reviewed the relevant anatomy using a model of the pelvis.  Weight bearing on the surgical side will be limited for three weeks after surgery, and the patient will need crutches during this time.  Physical therapy will be initiated after three weeks, and full recovery typically takes 6-12 weeks.      We reviewed the risks and benefits of the surgery in detail. The risks include those associated with anesthesia, including death, pulmonary embolism, DVT, stroke, myocardial infarction, pneumonia, blindness, and urinary tract infection. Additional risks include the risk of blood loss, infection, nerve damage, failure to heal, hardware problems and failure of the intervention to improve symptoms.  With regard to blood loss, we use a medication called tranexamic acid.  Blood loss is typically around 25 mL during an SI fusion, and we have not had to transfuse anyone during or after this particular procedure.  To mitigate the risk of infection, we use IV antibiotics.  Deep infections after this surgery are rare.  To avoid hardware problems and nerve damage, we use an intra-operative CT, which is like GPS for the spine. The patient understands the risks of the surgery and wishes to proceed.     Plan:  1. Right SI joint fusion     Angel Small MD   Orthopaedic Surgery Resident     I saw and evaluated the patient and developed the plan.  Yvan Henry MD

## 2023-02-11 ENCOUNTER — HEALTH MAINTENANCE LETTER (OUTPATIENT)
Age: 53
End: 2023-02-11

## 2024-03-09 ENCOUNTER — HEALTH MAINTENANCE LETTER (OUTPATIENT)
Age: 54
End: 2024-03-09

## (undated) DEVICE — LINEN BACK PACK 5440

## (undated) DEVICE — SU MONOCRYL 4-0 PS-2 18" UND Y496G

## (undated) DEVICE — DRAPE O ARM TUBE 9732722

## (undated) DEVICE — SYR 10ML FINGER CONTROL W/O NDL 309695

## (undated) DEVICE — DRAPE POUCH IRR 1016

## (undated) DEVICE — Device

## (undated) DEVICE — GLOVE PROTEXIS W/NEU-THERA 8.0  2D73TE80

## (undated) DEVICE — SU VICRYL 1 CT-1 36" UND J947H

## (undated) DEVICE — LINEN GOWN X4 5410

## (undated) DEVICE — SOL WATER IRRIG 1000ML BOTTLE 2F7114

## (undated) DEVICE — LINEN TOWEL PACK X5 5464

## (undated) DEVICE — SU VICRYL 2-0 CT-2 27" UND J269H

## (undated) DEVICE — PREP DURAPREP REMOVER 4OZ 8611

## (undated) DEVICE — MARKER SPHERES PASSIVE MEDT PACK 5 8801075

## (undated) DEVICE — PACK UNIVERSAL SPLIT 29131

## (undated) DEVICE — POSITIONER ARMBOARD FOAM 1PAIR LF FP-ARMB1

## (undated) DEVICE — ADH SKIN CLOSURE PREMIERPRO EXOFIN 1.0ML 3470

## (undated) DEVICE — STPL SKIN 35W ROTATING HEAD PRW35

## (undated) DEVICE — SPONGE SURGIFOAM 100 1974

## (undated) DEVICE — SOL NACL 0.9% IRRIG 1000ML BOTTLE 2F7124

## (undated) DEVICE — GLOVE PROTEXIS POWDER FREE 8.5 ORTHOPEDIC 2D73ET85

## (undated) DEVICE — DRSG PRIMAPORE 03 1/8X6" 66000318

## (undated) DEVICE — DRSG PRIMAPORE 02X3" 7133

## (undated) RX ORDER — OXYCODONE HYDROCHLORIDE 5 MG/1
TABLET ORAL
Status: DISPENSED
Start: 2021-12-20

## (undated) RX ORDER — BUPIVACAINE HYDROCHLORIDE AND EPINEPHRINE 2.5; 5 MG/ML; UG/ML
INJECTION, SOLUTION INFILTRATION; PERINEURAL
Status: DISPENSED
Start: 2021-12-20

## (undated) RX ORDER — CEFAZOLIN SODIUM 2 G/100ML
INJECTION, SOLUTION INTRAVENOUS
Status: DISPENSED
Start: 2021-12-20

## (undated) RX ORDER — GABAPENTIN 300 MG/1
CAPSULE ORAL
Status: DISPENSED
Start: 2021-12-20

## (undated) RX ORDER — HYDROMORPHONE HYDROCHLORIDE 1 MG/ML
INJECTION, SOLUTION INTRAMUSCULAR; INTRAVENOUS; SUBCUTANEOUS
Status: DISPENSED
Start: 2021-12-20

## (undated) RX ORDER — ACETAMINOPHEN 325 MG/1
TABLET ORAL
Status: DISPENSED
Start: 2021-12-20

## (undated) RX ORDER — FENTANYL CITRATE 50 UG/ML
INJECTION, SOLUTION INTRAMUSCULAR; INTRAVENOUS
Status: DISPENSED
Start: 2021-12-20